# Patient Record
Sex: MALE | Race: WHITE | Employment: FULL TIME | URBAN - METROPOLITAN AREA
[De-identification: names, ages, dates, MRNs, and addresses within clinical notes are randomized per-mention and may not be internally consistent; named-entity substitution may affect disease eponyms.]

---

## 2017-01-23 ENCOUNTER — OFFICE VISIT (OUTPATIENT)
Dept: INTERNAL MEDICINE CLINIC | Facility: CLINIC | Age: 42
End: 2017-01-23

## 2017-01-23 VITALS
SYSTOLIC BLOOD PRESSURE: 128 MMHG | HEART RATE: 104 BPM | WEIGHT: 241 LBS | DIASTOLIC BLOOD PRESSURE: 86 MMHG | TEMPERATURE: 99 F | BODY MASS INDEX: 33 KG/M2 | OXYGEN SATURATION: 97 %

## 2017-01-23 DIAGNOSIS — J01.90 ACUTE SINUSITIS, RECURRENCE NOT SPECIFIED, UNSPECIFIED LOCATION: Primary | ICD-10-CM

## 2017-01-23 DIAGNOSIS — R05.9 COUGH: ICD-10-CM

## 2017-01-23 PROCEDURE — 99213 OFFICE O/P EST LOW 20 MIN: CPT | Performed by: FAMILY MEDICINE

## 2017-01-23 RX ORDER — AZITHROMYCIN 250 MG/1
TABLET, FILM COATED ORAL
Qty: 6 TABLET | Refills: 0 | Status: SHIPPED | OUTPATIENT
Start: 2017-01-23 | End: 2017-04-19 | Stop reason: ALTCHOICE

## 2017-01-23 RX ORDER — TRIAMCINOLONE ACETONIDE 55 UG/1
1 SPRAY, METERED NASAL DAILY
COMMUNITY

## 2017-01-23 NOTE — PROGRESS NOTES
CHIEF COMPLAINT:   Patient presents with:  Sore Throat: since Thursday (1/19/17) characterized as a scratchy throat, feels like sand paper. A little bit of drainage. No cough. Using Sudafed, Tylenol, and Zicam.  Fatigue: and sleepy.   Feeling cold, prima atraumatic, normocephalic.   + pressure on palpation of maxillary or frontal sinuses  EYES: conjunctiva clear, EOM intact  EARS: TM's dull, no bulging, no retraction,+ fluid, bony landmarks not visible  NOSE: Nostrils patent, mucoid nasal discharge, nasal m

## 2017-02-06 ENCOUNTER — TELEPHONE (OUTPATIENT)
Dept: INTERNAL MEDICINE CLINIC | Facility: CLINIC | Age: 42
End: 2017-02-06

## 2017-02-06 RX ORDER — AMOXICILLIN AND CLAVULANATE POTASSIUM 875; 125 MG/1; MG/1
1 TABLET, FILM COATED ORAL 2 TIMES DAILY
Qty: 20 TABLET | Refills: 0 | Status: SHIPPED | OUTPATIENT
Start: 2017-02-06 | End: 2017-02-16

## 2017-03-01 RX ORDER — LOSARTAN POTASSIUM 50 MG/1
TABLET ORAL
Qty: 90 TABLET | Refills: 0 | Status: SHIPPED | OUTPATIENT
Start: 2017-03-01 | End: 2017-05-12

## 2017-03-28 ENCOUNTER — LAB ENCOUNTER (OUTPATIENT)
Dept: LAB | Age: 42
End: 2017-03-28
Attending: FAMILY MEDICINE
Payer: COMMERCIAL

## 2017-03-28 DIAGNOSIS — R79.9 ABNORMAL BLOOD CHEMISTRY: ICD-10-CM

## 2017-03-28 DIAGNOSIS — E78.1 PURE HYPERGLYCERIDEMIA: ICD-10-CM

## 2017-03-28 DIAGNOSIS — E03.9 HYPOTHYROIDISM, UNSPECIFIED TYPE: ICD-10-CM

## 2017-03-28 DIAGNOSIS — E03.9 HYPOTHYROIDISM: ICD-10-CM

## 2017-03-28 DIAGNOSIS — R74.8 ELEVATED LIVER ENZYMES: ICD-10-CM

## 2017-03-28 DIAGNOSIS — I10 ESSENTIAL HYPERTENSION: ICD-10-CM

## 2017-03-28 LAB
ALBUMIN SERPL-MCNC: 3.8 G/DL (ref 3.5–4.8)
ALP LIVER SERPL-CCNC: 95 U/L (ref 45–117)
ALT SERPL-CCNC: 85 U/L (ref 17–63)
AST SERPL-CCNC: 30 U/L (ref 15–41)
BASOPHILS # BLD AUTO: 0.11 X10(3) UL (ref 0–0.1)
BASOPHILS NFR BLD AUTO: 1.5 %
BILIRUB SERPL-MCNC: 0.7 MG/DL (ref 0.1–2)
BILIRUB UR QL STRIP.AUTO: NEGATIVE
BUN BLD-MCNC: 14 MG/DL (ref 8–20)
CALCIUM BLD-MCNC: 8.9 MG/DL (ref 8.3–10.3)
CHLORIDE: 108 MMOL/L (ref 101–111)
CHOLEST SMN-MCNC: 158 MG/DL (ref ?–200)
CLARITY UR REFRACT.AUTO: CLEAR
CO2: 27 MMOL/L (ref 22–32)
COLOR UR AUTO: YELLOW
CREAT BLD-MCNC: 1.1 MG/DL (ref 0.7–1.3)
EOSINOPHIL # BLD AUTO: 0.55 X10(3) UL (ref 0–0.3)
EOSINOPHIL NFR BLD AUTO: 7.5 %
ERYTHROCYTE [DISTWIDTH] IN BLOOD BY AUTOMATED COUNT: 13 % (ref 11.5–16)
GLUCOSE BLD-MCNC: 84 MG/DL (ref 70–99)
GLUCOSE UR STRIP.AUTO-MCNC: NEGATIVE MG/DL
HCT VFR BLD AUTO: 46.2 % (ref 37–53)
HDLC SERPL-MCNC: 35 MG/DL (ref 45–?)
HDLC SERPL: 4.51 {RATIO} (ref ?–4.97)
HGB BLD-MCNC: 16 G/DL (ref 13–17)
IMMATURE GRANULOCYTE COUNT: 0.05 X10(3) UL (ref 0–1)
IMMATURE GRANULOCYTE RATIO %: 0.7 %
KETONES UR STRIP.AUTO-MCNC: NEGATIVE MG/DL
LDLC SERPL CALC-MCNC: 96 MG/DL (ref ?–130)
LEUKOCYTE ESTERASE UR QL STRIP.AUTO: NEGATIVE
LYMPHOCYTES # BLD AUTO: 2.27 X10(3) UL (ref 0.9–4)
LYMPHOCYTES NFR BLD AUTO: 31 %
M PROTEIN MFR SERPL ELPH: 7.1 G/DL (ref 6.1–8.3)
MCH RBC QN AUTO: 30.1 PG (ref 27–33.2)
MCHC RBC AUTO-ENTMCNC: 34.6 G/DL (ref 31–37)
MCV RBC AUTO: 86.8 FL (ref 80–99)
MONOCYTES # BLD AUTO: 0.54 X10(3) UL (ref 0.1–0.6)
MONOCYTES NFR BLD AUTO: 7.4 %
NEUTROPHIL ABS PRELIM: 3.81 X10 (3) UL (ref 1.3–6.7)
NEUTROPHILS # BLD AUTO: 3.81 X10(3) UL (ref 1.3–6.7)
NEUTROPHILS NFR BLD AUTO: 51.9 %
NITRITE UR QL STRIP.AUTO: NEGATIVE
NONHDLC SERPL-MCNC: 123 MG/DL (ref ?–130)
PH UR STRIP.AUTO: 5 [PH] (ref 4.5–8)
PLATELET # BLD AUTO: 197 10(3)UL (ref 150–450)
POTASSIUM SERPL-SCNC: 4.1 MMOL/L (ref 3.6–5.1)
PROT UR STRIP.AUTO-MCNC: NEGATIVE MG/DL
RBC # BLD AUTO: 5.32 X10(6)UL (ref 4.3–5.7)
RBC UR QL AUTO: NEGATIVE
RED CELL DISTRIBUTION WIDTH-SD: 40.6 FL (ref 35.1–46.3)
SODIUM SERPL-SCNC: 140 MMOL/L (ref 136–144)
SP GR UR STRIP.AUTO: 1.02 (ref 1–1.03)
TRIGLYCERIDES: 137 MG/DL (ref ?–150)
TSI SER-ACNC: 6.69 MIU/ML (ref 0.35–5.5)
UROBILINOGEN UR STRIP.AUTO-MCNC: <2 MG/DL
VLDL: 27 MG/DL (ref 5–40)
WBC # BLD AUTO: 7.3 X10(3) UL (ref 4–13)

## 2017-03-28 PROCEDURE — 85025 COMPLETE CBC W/AUTO DIFF WBC: CPT

## 2017-03-28 PROCEDURE — 84443 ASSAY THYROID STIM HORMONE: CPT

## 2017-03-28 PROCEDURE — 80061 LIPID PANEL: CPT

## 2017-03-28 PROCEDURE — 81003 URINALYSIS AUTO W/O SCOPE: CPT

## 2017-03-28 PROCEDURE — 36415 COLL VENOUS BLD VENIPUNCTURE: CPT

## 2017-03-28 PROCEDURE — 80053 COMPREHEN METABOLIC PANEL: CPT

## 2017-04-19 ENCOUNTER — OFFICE VISIT (OUTPATIENT)
Dept: INTERNAL MEDICINE CLINIC | Facility: CLINIC | Age: 42
End: 2017-04-19

## 2017-04-19 VITALS
DIASTOLIC BLOOD PRESSURE: 82 MMHG | OXYGEN SATURATION: 96 % | TEMPERATURE: 98 F | HEIGHT: 71 IN | BODY MASS INDEX: 34.3 KG/M2 | WEIGHT: 245 LBS | RESPIRATION RATE: 16 BRPM | SYSTOLIC BLOOD PRESSURE: 108 MMHG | HEART RATE: 101 BPM

## 2017-04-19 DIAGNOSIS — I10 ESSENTIAL HYPERTENSION: ICD-10-CM

## 2017-04-19 DIAGNOSIS — E66.9 OBESITY (BMI 30-39.9): ICD-10-CM

## 2017-04-19 DIAGNOSIS — E03.9 HYPOTHYROIDISM, UNSPECIFIED TYPE: ICD-10-CM

## 2017-04-19 DIAGNOSIS — Z00.00 WELL ADULT EXAM: Primary | ICD-10-CM

## 2017-04-19 DIAGNOSIS — R74.8 ELEVATED LIVER ENZYMES: ICD-10-CM

## 2017-04-19 PROCEDURE — 99396 PREV VISIT EST AGE 40-64: CPT | Performed by: FAMILY MEDICINE

## 2017-04-19 NOTE — PROGRESS NOTES
HPI:    Patient ID: Malvin Bertrand is a 39year old male.     HPI  Malvin Bertrand is a 39year old male who presents for a complete physical exam.   HPI:       Wt Readings from Last 6 Encounters:  04/19/17 : 245 lb  01/23/17 : 241 lb  10/19/16 : 233 lb  06/ Other      Grandmother   • Hypertension Father    • Thyroid Disorder Mother    • Cancer Maternal Grandmother      uterine      Social History:    Smoking Status: Never Smoker                      Smokeless Status: Never Used                        Alcohol year old male who presents for a complete physical exam. Pt's weight is Body mass index is 34.19 kg/(m^2). , recommended low fat diet and aerobic exercise 30 minutes three times weekly.  Health maintenance,.discussed labs   Watch tylenol use    Discussed wt

## 2017-05-15 RX ORDER — LOSARTAN POTASSIUM 50 MG/1
TABLET ORAL
Qty: 90 TABLET | Refills: 0 | Status: SHIPPED | OUTPATIENT
Start: 2017-05-15 | End: 2017-07-30

## 2017-06-05 ENCOUNTER — APPOINTMENT (OUTPATIENT)
Dept: LAB | Age: 42
End: 2017-06-05
Attending: FAMILY MEDICINE
Payer: COMMERCIAL

## 2017-06-05 DIAGNOSIS — E03.9 HYPOTHYROIDISM, UNSPECIFIED TYPE: ICD-10-CM

## 2017-06-05 PROCEDURE — 84443 ASSAY THYROID STIM HORMONE: CPT | Performed by: FAMILY MEDICINE

## 2017-06-05 PROCEDURE — 36415 COLL VENOUS BLD VENIPUNCTURE: CPT | Performed by: FAMILY MEDICINE

## 2017-06-07 RX ORDER — LEVOTHYROXINE SODIUM 137 UG/1
137 TABLET ORAL
Qty: 90 TABLET | Refills: 1 | Status: SHIPPED | OUTPATIENT
Start: 2017-06-07 | End: 2017-11-01

## 2017-06-29 ENCOUNTER — OFFICE VISIT (OUTPATIENT)
Dept: INTERNAL MEDICINE CLINIC | Facility: CLINIC | Age: 42
End: 2017-06-29

## 2017-06-29 VITALS
RESPIRATION RATE: 12 BRPM | HEART RATE: 112 BPM | OXYGEN SATURATION: 95 % | DIASTOLIC BLOOD PRESSURE: 82 MMHG | BODY MASS INDEX: 34.51 KG/M2 | SYSTOLIC BLOOD PRESSURE: 138 MMHG | HEIGHT: 71 IN | TEMPERATURE: 99 F | WEIGHT: 246.5 LBS

## 2017-06-29 DIAGNOSIS — K22.2 ESOPHAGEAL OBSTRUCTION DUE TO FOOD IMPACTION: Primary | ICD-10-CM

## 2017-06-29 DIAGNOSIS — T18.128A ESOPHAGEAL OBSTRUCTION DUE TO FOOD IMPACTION: Primary | ICD-10-CM

## 2017-06-29 PROCEDURE — 99213 OFFICE O/P EST LOW 20 MIN: CPT | Performed by: FAMILY MEDICINE

## 2017-06-29 NOTE — PROGRESS NOTES
HPI:    Patient ID: Kate Street is a 39year old male.     HPI  Last night eating a steak and got stuck in the esophagus    Was able to breathe ok but could now get it down   Even saliva was stuck  Was throwing up    Seen in [address] and had EGD to remove th

## 2017-08-01 RX ORDER — LOSARTAN POTASSIUM 50 MG/1
TABLET ORAL
Qty: 90 TABLET | Refills: 0 | Status: SHIPPED | OUTPATIENT
Start: 2017-08-01 | End: 2017-09-14

## 2017-09-14 ENCOUNTER — OFFICE VISIT (OUTPATIENT)
Dept: INTERNAL MEDICINE CLINIC | Facility: CLINIC | Age: 42
End: 2017-09-14

## 2017-09-14 VITALS
BODY MASS INDEX: 34.86 KG/M2 | RESPIRATION RATE: 16 BRPM | SYSTOLIC BLOOD PRESSURE: 146 MMHG | HEART RATE: 108 BPM | OXYGEN SATURATION: 99 % | DIASTOLIC BLOOD PRESSURE: 90 MMHG | HEIGHT: 71 IN | WEIGHT: 249 LBS

## 2017-09-14 DIAGNOSIS — I10 ESSENTIAL HYPERTENSION: Primary | ICD-10-CM

## 2017-09-14 PROCEDURE — 99213 OFFICE O/P EST LOW 20 MIN: CPT | Performed by: FAMILY MEDICINE

## 2017-09-14 RX ORDER — LOSARTAN POTASSIUM AND HYDROCHLOROTHIAZIDE 12.5; 5 MG/1; MG/1
1 TABLET ORAL DAILY
Qty: 90 TABLET | Refills: 0 | Status: SHIPPED | OUTPATIENT
Start: 2017-09-14 | End: 2017-11-17

## 2017-09-14 NOTE — PROGRESS NOTES
HPI:    Patient ID: Ben Kirk is a 43year old male. HPI  Ben Kirk is a 43year old male. HPI:   Patient presents for recheck of his hypertension.  Pt has been taking medications as instructed, no medication side effects, home BP monitorin MCG/ACT Nasal Aerosol 1 spray by Nasal route daily. Disp:  Rfl:    Fexofenadine HCl (ALLEGRA) 180 MG Oral Tab Take 180 mg by mouth daily as needed. Disp:  Rfl:       No past medical history on file. No past surgical history on file.    Social History: orders of the defined types were placed in this encounter.       Meds This Visit:  No prescriptions requested or ordered in this encounter    Imaging & Referrals:  None       [id number]

## 2017-10-04 ENCOUNTER — OFFICE VISIT (OUTPATIENT)
Dept: INTERNAL MEDICINE CLINIC | Facility: CLINIC | Age: 42
End: 2017-10-04

## 2017-10-04 VITALS
SYSTOLIC BLOOD PRESSURE: 124 MMHG | DIASTOLIC BLOOD PRESSURE: 76 MMHG | OXYGEN SATURATION: 96 % | HEART RATE: 91 BPM | HEIGHT: 71 IN | TEMPERATURE: 99 F | RESPIRATION RATE: 22 BRPM | BODY MASS INDEX: 34.86 KG/M2 | WEIGHT: 249 LBS

## 2017-10-04 DIAGNOSIS — I10 ESSENTIAL HYPERTENSION: Primary | ICD-10-CM

## 2017-10-04 PROCEDURE — 99213 OFFICE O/P EST LOW 20 MIN: CPT | Performed by: FAMILY MEDICINE

## 2017-10-04 NOTE — PROGRESS NOTES
HPI:    Patient ID: Aric Meyer is a 43year old male. HPI  Aric Meyer is a 43year old male. HPI:   Patient presents for recheck of his hypertension.  Pt has been taking medications as instructed, no medication side effects, home BP monitorin MCG/ACT Nasal Aerosol 1 spray by Nasal route daily. Disp:  Rfl:    Fexofenadine HCl (ALLEGRA) 180 MG Oral Tab Take 180 mg by mouth daily as needed. Disp:  Rfl:       History reviewed. No pertinent past medical history. History reviewed.  No pertinent kline Exam           ASSESSMENT/PLAN:   No diagnosis found. No orders of the defined types were placed in this encounter.       Meds This Visit:  No prescriptions requested or ordered in this encounter    Imaging & Referrals:  None       PF#[id number]

## 2017-10-15 ENCOUNTER — CHARTING TRANS (OUTPATIENT)
Dept: OTHER | Age: 42
End: 2017-10-15

## 2017-11-01 ENCOUNTER — OFFICE VISIT (OUTPATIENT)
Dept: INTERNAL MEDICINE CLINIC | Facility: CLINIC | Age: 42
End: 2017-11-01

## 2017-11-01 VITALS
TEMPERATURE: 99 F | SYSTOLIC BLOOD PRESSURE: 110 MMHG | BODY MASS INDEX: 34 KG/M2 | WEIGHT: 246.5 LBS | DIASTOLIC BLOOD PRESSURE: 66 MMHG | RESPIRATION RATE: 12 BRPM | HEART RATE: 88 BPM

## 2017-11-01 DIAGNOSIS — J18.9 PNEUMONIA OF RIGHT LUNG DUE TO INFECTIOUS ORGANISM, UNSPECIFIED PART OF LUNG: Primary | ICD-10-CM

## 2017-11-01 PROCEDURE — 99213 OFFICE O/P EST LOW 20 MIN: CPT | Performed by: FAMILY MEDICINE

## 2017-11-01 RX ORDER — LEVOTHYROXINE SODIUM 137 UG/1
TABLET ORAL
Qty: 90 TABLET | Refills: 0 | Status: SHIPPED | OUTPATIENT
Start: 2017-11-01 | End: 2018-02-21

## 2017-11-01 NOTE — PROGRESS NOTES
HPI:    Patient ID: Ankush Samayoa is a 43year old male.     HPI  Here for f/u on PNA     Was seen in [facility] 10/15/17  , dx'd w it in L      Is done w the [facility] now    Was also seen in [address] [place] [address] Dr and told had lung findings as well   Was having cough and

## 2017-11-21 RX ORDER — LOSARTAN POTASSIUM AND HYDROCHLOROTHIAZIDE 12.5; 5 MG/1; MG/1
TABLET ORAL
Qty: 90 TABLET | Refills: 0 | Status: SHIPPED | OUTPATIENT
Start: 2017-11-21 | End: 2018-02-21

## 2017-11-21 NOTE — TELEPHONE ENCOUNTER
Medication passed protocol. Requesting Losartan potassium hctz 50-12.5 mg tabs  LOV: Acute OV 11/1/17 - f/u pneumonia, HTN f/u on 10/4/17  RTC: 6 months  Last Relevant Labs: 3/28/17  Filled: Last sent as 90 with no refills on 9/14/17.      No future trey

## 2017-12-01 ENCOUNTER — TELEPHONE (OUTPATIENT)
Dept: INTERNAL MEDICINE CLINIC | Facility: CLINIC | Age: 42
End: 2017-12-01

## 2017-12-01 RX ORDER — CEFDINIR 300 MG/1
300 CAPSULE ORAL 2 TIMES DAILY
Qty: 20 CAPSULE | Refills: 0 | Status: SHIPPED | OUTPATIENT
Start: 2017-12-01 | End: 2017-12-11

## 2017-12-01 NOTE — TELEPHONE ENCOUNTER
Patient is a  and will be in [facility] and booked an appointment tomorrow but would like an RX sent today for URI if possible.   Told wife that booked an appointment for tomorrow but she would like callback about RX

## 2017-12-02 ENCOUNTER — OFFICE VISIT (OUTPATIENT)
Dept: INTERNAL MEDICINE CLINIC | Facility: CLINIC | Age: 42
End: 2017-12-02

## 2017-12-02 VITALS
HEART RATE: 86 BPM | OXYGEN SATURATION: 95 % | WEIGHT: 248.5 LBS | TEMPERATURE: 99 F | BODY MASS INDEX: 34.79 KG/M2 | HEIGHT: 71 IN | RESPIRATION RATE: 16 BRPM | DIASTOLIC BLOOD PRESSURE: 86 MMHG | SYSTOLIC BLOOD PRESSURE: 110 MMHG

## 2017-12-02 DIAGNOSIS — R05.9 COUGH: Primary | ICD-10-CM

## 2017-12-02 PROCEDURE — 99213 OFFICE O/P EST LOW 20 MIN: CPT | Performed by: FAMILY MEDICINE

## 2017-12-02 RX ORDER — ALBUTEROL SULFATE 90 UG/1
2 AEROSOL, METERED RESPIRATORY (INHALATION) EVERY 4 HOURS PRN
Qty: 1 INHALER | Refills: 1 | Status: SHIPPED | OUTPATIENT
Start: 2017-12-02 | End: 2019-02-20 | Stop reason: ALTCHOICE

## 2017-12-02 NOTE — PROGRESS NOTES
HPI:    Patient ID: Gordon Monroy is a 43year old male. HPI  HPI:   Gordon Monroy is a 43year old male who presents for upper respiratory symptoms for  4  days.  Patient reports congestion, cough with ylw colored sputum, cough is keeping pt up at nig GENERAL: well developed, well nourished,in no apparent distress  SKIN: no rashes  EYES:conj clear  HEENT: atraumatic, normocephalic,ears and throat are clear  NECK: supple,no adenopathy,  LUNGS: clear to auscultation        ASSESSMENT AND PLAN:   Alba Kraft

## 2018-01-02 ENCOUNTER — OFFICE VISIT (OUTPATIENT)
Dept: INTERNAL MEDICINE CLINIC | Facility: CLINIC | Age: 43
End: 2018-01-02

## 2018-01-02 VITALS
OXYGEN SATURATION: 97 % | HEART RATE: 83 BPM | DIASTOLIC BLOOD PRESSURE: 84 MMHG | RESPIRATION RATE: 16 BRPM | SYSTOLIC BLOOD PRESSURE: 118 MMHG | TEMPERATURE: 99 F | WEIGHT: 242.75 LBS | BODY MASS INDEX: 34 KG/M2

## 2018-01-02 DIAGNOSIS — J11.1 INFLUENZA: Primary | ICD-10-CM

## 2018-01-02 PROCEDURE — 99213 OFFICE O/P EST LOW 20 MIN: CPT | Performed by: FAMILY MEDICINE

## 2018-01-02 RX ORDER — OSELTAMIVIR PHOSPHATE 75 MG/1
75 CAPSULE ORAL 2 TIMES DAILY
Qty: 10 CAPSULE | Refills: 0 | Status: SHIPPED | OUTPATIENT
Start: 2018-01-02 | End: 2018-05-17 | Stop reason: ALTCHOICE

## 2018-01-02 NOTE — PROGRESS NOTES
CHIEF COMPLAINT:   Patient presents with:  Flu: since yesterday morning. HPI:   Ben Kirk is a 43year old male who presents for upper respiratory symptoms for  2 days.  Patient reports congestion, low grade fever, dry cough, ear pain, OTC cold m palpation of maxillary or frontal sinuses  EYES: conjunctiva clear, EOM intact  EARS: TM's dull.  no bulging, no retraction,+ fluid  NOSE: Nostrils patent, clear nasal discharge, nasal mucosa deep pink  THROAT: Oral mucosa pink, moist. Posterior pharynx is

## 2018-02-19 ENCOUNTER — PATIENT MESSAGE (OUTPATIENT)
Dept: INTERNAL MEDICINE CLINIC | Facility: CLINIC | Age: 43
End: 2018-02-19

## 2018-02-19 NOTE — TELEPHONE ENCOUNTER
From: Karen Nolen  To: Tiffanie Oneill MD  Sent: 2/19/2018 11:56 AM CST  Subject: Prescription Question    Just a heads up. I've submitted refill request through [facility]. The new mail order pharmacy for my insurance.

## 2018-02-21 NOTE — TELEPHONE ENCOUNTER
Ok to send 90 to mail order for these meds?  10//2017 lasrt med visit seen since then for acute visits

## 2018-02-22 RX ORDER — LEVOTHYROXINE SODIUM 137 UG/1
TABLET ORAL
Qty: 90 TABLET | Refills: 0 | Status: SHIPPED | OUTPATIENT
Start: 2018-02-22 | End: 2018-03-28 | Stop reason: DRUGHIGH

## 2018-02-22 RX ORDER — LOSARTAN POTASSIUM AND HYDROCHLOROTHIAZIDE 12.5; 5 MG/1; MG/1
1 TABLET ORAL
Qty: 90 TABLET | Refills: 0 | Status: SHIPPED | OUTPATIENT
Start: 2018-02-22 | End: 2018-05-17

## 2018-03-26 ENCOUNTER — APPOINTMENT (OUTPATIENT)
Dept: LAB | Age: 43
End: 2018-03-26
Attending: FAMILY MEDICINE
Payer: COMMERCIAL

## 2018-03-26 DIAGNOSIS — I10 ESSENTIAL HYPERTENSION, BENIGN: ICD-10-CM

## 2018-03-26 DIAGNOSIS — E78.1 HYPERGLYCERIDEMIA: ICD-10-CM

## 2018-03-26 DIAGNOSIS — E03.9 HYPOTHYROIDISM, ADULT: ICD-10-CM

## 2018-03-26 LAB
ALBUMIN SERPL-MCNC: 3.8 G/DL (ref 3.5–4.8)
ALP LIVER SERPL-CCNC: 115 U/L (ref 45–117)
ALT SERPL-CCNC: 94 U/L (ref 17–63)
AST SERPL-CCNC: 41 U/L (ref 15–41)
BILIRUB SERPL-MCNC: 0.8 MG/DL (ref 0.1–2)
BUN BLD-MCNC: 13 MG/DL (ref 8–20)
CALCIUM BLD-MCNC: 8.7 MG/DL (ref 8.3–10.3)
CHLORIDE: 106 MMOL/L (ref 101–111)
CHOLEST SMN-MCNC: 146 MG/DL (ref ?–200)
CO2: 27 MMOL/L (ref 22–32)
CREAT BLD-MCNC: 1.04 MG/DL (ref 0.7–1.3)
GLUCOSE BLD-MCNC: 89 MG/DL (ref 70–99)
HDLC SERPL-MCNC: 29 MG/DL (ref 45–?)
HDLC SERPL: 5.03 {RATIO} (ref ?–4.97)
LDLC SERPL CALC-MCNC: 91 MG/DL (ref ?–130)
M PROTEIN MFR SERPL ELPH: 7 G/DL (ref 6.1–8.3)
NONHDLC SERPL-MCNC: 117 MG/DL (ref ?–130)
POTASSIUM SERPL-SCNC: 3.9 MMOL/L (ref 3.6–5.1)
SODIUM SERPL-SCNC: 140 MMOL/L (ref 136–144)
TRIGL SERPL-MCNC: 132 MG/DL (ref ?–150)
TSI SER-ACNC: 8.98 MIU/ML (ref 0.35–5.5)
VLDLC SERPL CALC-MCNC: 26 MG/DL (ref 5–40)

## 2018-03-26 PROCEDURE — 84443 ASSAY THYROID STIM HORMONE: CPT

## 2018-03-26 PROCEDURE — 80053 COMPREHEN METABOLIC PANEL: CPT

## 2018-03-26 PROCEDURE — 36415 COLL VENOUS BLD VENIPUNCTURE: CPT

## 2018-03-26 PROCEDURE — 80061 LIPID PANEL: CPT

## 2018-03-28 ENCOUNTER — TELEPHONE (OUTPATIENT)
Dept: INTERNAL MEDICINE CLINIC | Facility: CLINIC | Age: 43
End: 2018-03-28

## 2018-03-28 DIAGNOSIS — E03.9 HYPOTHYROIDISM, UNSPECIFIED TYPE: Primary | ICD-10-CM

## 2018-03-28 RX ORDER — LEVOTHYROXINE SODIUM 0.15 MG/1
150 TABLET ORAL
Qty: 90 TABLET | Refills: 0 | Status: SHIPPED | OUTPATIENT
Start: 2018-03-28 | End: 2018-05-17

## 2018-03-28 NOTE — TELEPHONE ENCOUNTER
----- Message from Nicholas Spivey MD sent at 3/27/2018  8:55 AM CDT -----  Increase levothyroxine to 150mcg daily #90  Vishal 2 weeks  Otherwise labs are stable

## 2018-04-13 RX ORDER — LOSARTAN POTASSIUM AND HYDROCHLOROTHIAZIDE 12.5; 5 MG/1; MG/1
1 TABLET ORAL
Qty: 90 TABLET | OUTPATIENT
Start: 2018-04-13

## 2018-04-13 RX ORDER — LEVOTHYROXINE SODIUM 137 UG/1
TABLET ORAL
Qty: 90 TABLET | OUTPATIENT
Start: 2018-04-13

## 2018-04-16 ENCOUNTER — APPOINTMENT (OUTPATIENT)
Dept: LAB | Age: 43
End: 2018-04-16
Attending: FAMILY MEDICINE
Payer: COMMERCIAL

## 2018-04-16 DIAGNOSIS — E03.9 HYPOTHYROIDISM, UNSPECIFIED TYPE: ICD-10-CM

## 2018-04-16 PROCEDURE — 36415 COLL VENOUS BLD VENIPUNCTURE: CPT

## 2018-04-16 PROCEDURE — 84443 ASSAY THYROID STIM HORMONE: CPT

## 2018-04-17 ENCOUNTER — TELEPHONE (OUTPATIENT)
Dept: INTERNAL MEDICINE CLINIC | Facility: CLINIC | Age: 43
End: 2018-04-17

## 2018-04-17 DIAGNOSIS — E03.9 HYPOTHYROIDISM, UNSPECIFIED TYPE: Primary | ICD-10-CM

## 2018-04-17 NOTE — TELEPHONE ENCOUNTER
----- Message from Jana Baker MD sent at 4/16/2018  5:31 PM CDT -----  Seems a bit slow to respond   Lets gisell in a month please

## 2018-04-24 ENCOUNTER — MED REC SCAN ONLY (OUTPATIENT)
Dept: INTERNAL MEDICINE CLINIC | Facility: CLINIC | Age: 43
End: 2018-04-24

## 2018-05-15 ENCOUNTER — APPOINTMENT (OUTPATIENT)
Dept: LAB | Age: 43
End: 2018-05-15
Attending: FAMILY MEDICINE
Payer: COMMERCIAL

## 2018-05-15 DIAGNOSIS — E03.9 HYPOTHYROIDISM, UNSPECIFIED TYPE: ICD-10-CM

## 2018-05-15 PROCEDURE — 84443 ASSAY THYROID STIM HORMONE: CPT

## 2018-05-15 PROCEDURE — 36415 COLL VENOUS BLD VENIPUNCTURE: CPT

## 2018-05-17 ENCOUNTER — OFFICE VISIT (OUTPATIENT)
Dept: INTERNAL MEDICINE CLINIC | Facility: CLINIC | Age: 43
End: 2018-05-17

## 2018-05-17 VITALS
TEMPERATURE: 99 F | BODY MASS INDEX: 34.16 KG/M2 | SYSTOLIC BLOOD PRESSURE: 110 MMHG | WEIGHT: 244 LBS | OXYGEN SATURATION: 98 % | HEART RATE: 85 BPM | DIASTOLIC BLOOD PRESSURE: 72 MMHG | HEIGHT: 71 IN | RESPIRATION RATE: 16 BRPM

## 2018-05-17 DIAGNOSIS — Z00.00 WELLNESS EXAMINATION: Primary | ICD-10-CM

## 2018-05-17 DIAGNOSIS — E03.9 HYPOTHYROIDISM, UNSPECIFIED TYPE: ICD-10-CM

## 2018-05-17 PROCEDURE — 99396 PREV VISIT EST AGE 40-64: CPT | Performed by: FAMILY MEDICINE

## 2018-05-17 RX ORDER — LOSARTAN POTASSIUM AND HYDROCHLOROTHIAZIDE 12.5; 5 MG/1; MG/1
1 TABLET ORAL
Qty: 90 TABLET | Refills: 1 | Status: SHIPPED | OUTPATIENT
Start: 2018-05-17 | End: 2018-10-21

## 2018-05-17 RX ORDER — LEVOTHYROXINE SODIUM 0.15 MG/1
150 TABLET ORAL
Qty: 90 TABLET | Refills: 1 | Status: SHIPPED | OUTPATIENT
Start: 2018-05-17 | End: 2018-10-21

## 2018-05-17 NOTE — PROGRESS NOTES
HPI:    Patient ID: Shirlene Fowler is a 43year old male.     HPI  Shirlene Fowler is a 43year old male who presents for a complete physical exam.   HPI:       Wt Readings from Last 6 Encounters:  05/17/18 : 244 lb  01/02/18 : 242 lb 12 oz  12/02/17 : 248 l Aero Soln Inhale 2 puffs into the lungs every 4 (four) hours as needed for Wheezing. Disp: 1 Inhaler Rfl: 1      No past medical history on file. No past surgical history on file.    Family History   Problem Relation Age of Onset   • Cancer Other      Col rectal tone, prostate shows no masses, stool is OB negative  MUSCULOSKELETAL: back is not tender,FROM of the back  EXTREMITIES: no cyanosis, clubbing or edema  NEURO: Oriented times three,cranial nerves are intact,motor and sensory are grossly intact    AS

## 2018-09-20 ENCOUNTER — APPOINTMENT (OUTPATIENT)
Dept: LAB | Age: 43
End: 2018-09-20
Attending: FAMILY MEDICINE
Payer: COMMERCIAL

## 2018-09-20 ENCOUNTER — OFFICE VISIT (OUTPATIENT)
Dept: INTERNAL MEDICINE CLINIC | Facility: CLINIC | Age: 43
End: 2018-09-20
Payer: COMMERCIAL

## 2018-09-20 VITALS
WEIGHT: 246 LBS | OXYGEN SATURATION: 97 % | BODY MASS INDEX: 34.44 KG/M2 | HEIGHT: 71 IN | RESPIRATION RATE: 14 BRPM | DIASTOLIC BLOOD PRESSURE: 77 MMHG | HEART RATE: 79 BPM | TEMPERATURE: 99 F | SYSTOLIC BLOOD PRESSURE: 123 MMHG

## 2018-09-20 DIAGNOSIS — I10 ESSENTIAL HYPERTENSION: ICD-10-CM

## 2018-09-20 DIAGNOSIS — E03.9 HYPOTHYROIDISM, UNSPECIFIED TYPE: Primary | ICD-10-CM

## 2018-09-20 DIAGNOSIS — E03.9 HYPOTHYROIDISM, UNSPECIFIED TYPE: ICD-10-CM

## 2018-09-20 LAB — TSI SER-ACNC: 4.81 MIU/ML (ref 0.35–5.5)

## 2018-09-20 PROCEDURE — 84443 ASSAY THYROID STIM HORMONE: CPT

## 2018-09-20 PROCEDURE — 36415 COLL VENOUS BLD VENIPUNCTURE: CPT

## 2018-09-20 PROCEDURE — 99213 OFFICE O/P EST LOW 20 MIN: CPT | Performed by: FAMILY MEDICINE

## 2018-09-20 NOTE — PROGRESS NOTES
HPI:    Patient ID: Porfirio Leonard is a 37year old male.     HPI  Here for med ck   Overall doing well   No recent BP cks   Taking meds as directed    Needs letter for [address] again     Review of Systems   Respiratory: Negative for cough, chest tightness and s

## 2018-09-21 ENCOUNTER — TELEPHONE (OUTPATIENT)
Dept: INTERNAL MEDICINE CLINIC | Facility: CLINIC | Age: 43
End: 2018-09-21

## 2018-09-21 DIAGNOSIS — E03.9 HYPOTHYROIDISM, UNSPECIFIED TYPE: Primary | ICD-10-CM

## 2018-10-21 DIAGNOSIS — E03.9 HYPOTHYROIDISM, UNSPECIFIED TYPE: ICD-10-CM

## 2018-10-22 RX ORDER — LEVOTHYROXINE SODIUM 0.15 MG/1
TABLET ORAL
Qty: 90 TABLET | Refills: 1 | Status: SHIPPED | OUTPATIENT
Start: 2018-10-22 | End: 2019-03-29

## 2018-10-22 RX ORDER — LOSARTAN POTASSIUM AND HYDROCHLOROTHIAZIDE 12.5; 5 MG/1; MG/1
1 TABLET ORAL
Qty: 90 TABLET | Refills: 1 | Status: SHIPPED | OUTPATIENT
Start: 2018-10-22 | End: 2019-03-29

## 2018-10-25 ENCOUNTER — HOSPITAL ENCOUNTER (OUTPATIENT)
Dept: GENERAL RADIOLOGY | Age: 43
Discharge: HOME | End: 2018-10-25
Attending: FAMILY MEDICINE
Payer: COMMERCIAL

## 2018-10-25 ENCOUNTER — OFFICE VISIT (OUTPATIENT)
Dept: INTERNAL MEDICINE CLINIC | Facility: CLINIC | Age: 43
End: 2018-10-25
Payer: COMMERCIAL

## 2018-10-25 VITALS
TEMPERATURE: 98 F | DIASTOLIC BLOOD PRESSURE: 74 MMHG | BODY MASS INDEX: 35 KG/M2 | HEIGHT: 71 IN | WEIGHT: 250 LBS | RESPIRATION RATE: 16 BRPM | OXYGEN SATURATION: 98 % | SYSTOLIC BLOOD PRESSURE: 126 MMHG | HEART RATE: 84 BPM

## 2018-10-25 DIAGNOSIS — M79.671 FOOT PAIN, RIGHT: ICD-10-CM

## 2018-10-25 DIAGNOSIS — M79.671 FOOT PAIN, RIGHT: Primary | ICD-10-CM

## 2018-10-25 PROCEDURE — 99213 OFFICE O/P EST LOW 20 MIN: CPT | Performed by: FAMILY MEDICINE

## 2018-10-25 PROCEDURE — 73630 X-RAY EXAM OF FOOT: CPT | Performed by: FAMILY MEDICINE

## 2018-10-25 RX ORDER — MELOXICAM 15 MG/1
15 TABLET ORAL DAILY
Qty: 30 TABLET | Refills: 0 | Status: SHIPPED | OUTPATIENT
Start: 2018-10-25 | End: 2019-02-20 | Stop reason: ALTCHOICE

## 2018-10-25 NOTE — PROGRESS NOTES
HPI:    Patient ID: Maeve Hunt is a 37year old male.     HPI  R foot plantar aspect sore 2 months   No injury  Can be worse in the AM  , not always though    No new shoes   No swelling  Aware of     Tender near the heel ,medial aspect      No meds    R

## 2018-11-02 VITALS
HEIGHT: 72 IN | WEIGHT: 240 LBS | HEART RATE: 102 BPM | OXYGEN SATURATION: 98 % | RESPIRATION RATE: 20 BRPM | BODY MASS INDEX: 32.51 KG/M2 | TEMPERATURE: 98.9 F

## 2019-02-20 ENCOUNTER — OFFICE VISIT (OUTPATIENT)
Dept: INTERNAL MEDICINE CLINIC | Facility: CLINIC | Age: 44
End: 2019-02-20
Payer: COMMERCIAL

## 2019-02-20 VITALS
DIASTOLIC BLOOD PRESSURE: 81 MMHG | TEMPERATURE: 99 F | WEIGHT: 248.75 LBS | BODY MASS INDEX: 35.21 KG/M2 | HEART RATE: 78 BPM | HEIGHT: 70.5 IN | SYSTOLIC BLOOD PRESSURE: 120 MMHG | RESPIRATION RATE: 16 BRPM

## 2019-02-20 DIAGNOSIS — H69.83 DYSFUNCTION OF BOTH EUSTACHIAN TUBES: ICD-10-CM

## 2019-02-20 DIAGNOSIS — J01.10 ACUTE FRONTAL SINUSITIS, RECURRENCE NOT SPECIFIED: Primary | ICD-10-CM

## 2019-02-20 PROCEDURE — 99213 OFFICE O/P EST LOW 20 MIN: CPT | Performed by: FAMILY MEDICINE

## 2019-02-20 RX ORDER — AMOXICILLIN AND CLAVULANATE POTASSIUM 875; 125 MG/1; MG/1
1 TABLET, FILM COATED ORAL 2 TIMES DAILY
Qty: 20 TABLET | Refills: 0 | Status: SHIPPED | OUTPATIENT
Start: 2019-02-20 | End: 2019-03-02

## 2019-02-20 RX ORDER — METHYLPREDNISOLONE 4 MG/1
TABLET ORAL
Qty: 1 KIT | Refills: 0 | Status: SHIPPED | OUTPATIENT
Start: 2019-02-20 | End: 2019-02-26 | Stop reason: ALTCHOICE

## 2019-02-20 NOTE — PROGRESS NOTES
CHIEF COMPLAINT:   Patient presents with:  Sinus Problem: sinus pressure every other day   Ear Problem: ear pressure       HPI:   Porfirio Leonard is a 37year old male who presents for sinus symptoms for  3 weeks.  Patient reports congestion, dry cough, coug well nourished,in no apparent distress  SKIN: no rashes,no suspicious lesions  HEAD: atraumatic, normocephalic.  + tenderness on palpation of maxillary or frontal sinuses  EYES: conjunctiva clear, EOM intact  EARS: TM's dull, no bulging, no retraction,+ fl

## 2019-02-26 ENCOUNTER — OFFICE VISIT (OUTPATIENT)
Dept: INTERNAL MEDICINE CLINIC | Facility: CLINIC | Age: 44
End: 2019-02-26
Payer: COMMERCIAL

## 2019-02-26 VITALS
DIASTOLIC BLOOD PRESSURE: 88 MMHG | SYSTOLIC BLOOD PRESSURE: 122 MMHG | RESPIRATION RATE: 14 BRPM | WEIGHT: 249 LBS | HEART RATE: 74 BPM | HEIGHT: 70.5 IN | TEMPERATURE: 97 F | OXYGEN SATURATION: 96 % | BODY MASS INDEX: 35.25 KG/M2

## 2019-02-26 DIAGNOSIS — H66.91 ACUTE RIGHT OTITIS MEDIA: Primary | ICD-10-CM

## 2019-02-26 DIAGNOSIS — J32.9 SINUSITIS, UNSPECIFIED CHRONICITY, UNSPECIFIED LOCATION: ICD-10-CM

## 2019-02-26 PROCEDURE — 99213 OFFICE O/P EST LOW 20 MIN: CPT | Performed by: FAMILY MEDICINE

## 2019-02-26 RX ORDER — PREDNISONE 10 MG/1
TABLET ORAL
Qty: 20 TABLET | Refills: 0 | Status: SHIPPED | OUTPATIENT
Start: 2019-02-26 | End: 2019-05-23 | Stop reason: ALTCHOICE

## 2019-02-26 RX ORDER — PREDNISONE 10 MG/1
TABLET ORAL
Qty: 20 TABLET | Refills: 0 | Status: CANCELLED | OUTPATIENT
Start: 2019-02-26

## 2019-02-26 RX ORDER — CEFDINIR 300 MG/1
300 CAPSULE ORAL 2 TIMES DAILY
Qty: 20 CAPSULE | Refills: 0 | Status: SHIPPED | OUTPATIENT
Start: 2019-02-26 | End: 2019-05-23 | Stop reason: ALTCHOICE

## 2019-02-26 NOTE — PROGRESS NOTES
CHIEF COMPLAINT:   Patient presents with:  Sinusitis: ears feel clogged/pressure, lots of drainage.  red in face, pt isn't sure if that is okay      HPI:   Karen Nolen is a 37year old male who presents for reoccurring upper respiratory symptoms, was see Alcohol use: No      Alcohol/week: 0.0 oz    Drug use: No        REVIEW OF SYSTEMS:   GENERAL: feels well otherwise,  normal appetite  SKIN: no rashes or abnormal skin lesions  HEENT: See HPI  LUNGS: denies shortness of breath or wheezing, See HPI  CARDIOV use a humidifier.   - cefdinir 300 MG Oral Cap; Take 1 capsule (300 mg total) by mouth 2 (two) times daily. Dispense: 20 capsule;  Refill: 0      Sinusitis, unspecified chronicity, unspecified location  Acute right otitis media  (primary encounter diagnosi

## 2019-03-29 DIAGNOSIS — E03.9 HYPOTHYROIDISM, UNSPECIFIED TYPE: ICD-10-CM

## 2019-04-01 RX ORDER — LEVOTHYROXINE SODIUM 0.15 MG/1
TABLET ORAL
Qty: 90 TABLET | Refills: 0 | Status: SHIPPED | OUTPATIENT
Start: 2019-04-01 | End: 2019-05-23 | Stop reason: ALTCHOICE

## 2019-04-01 RX ORDER — LOSARTAN POTASSIUM AND HYDROCHLOROTHIAZIDE 12.5; 5 MG/1; MG/1
1 TABLET ORAL
Qty: 90 TABLET | Refills: 0 | Status: SHIPPED | OUTPATIENT
Start: 2019-04-01 | End: 2019-06-11

## 2019-04-01 NOTE — TELEPHONE ENCOUNTER
Levothyroxine filled 10/22/18 #90 with 1 refill     Losartan Potassium - HCTZ last filled 10/22/18 #90 with 1 refill    LOV for BP 9/20/18     Last Labs : TSH 9/20/18 - Recheck 6 months                   CMP 3/26/18    LVM FOR PATIENT TO CALL AND MAKE APPT

## 2019-04-24 ENCOUNTER — PATIENT MESSAGE (OUTPATIENT)
Dept: INTERNAL MEDICINE CLINIC | Facility: CLINIC | Age: 44
End: 2019-04-24

## 2019-04-24 NOTE — TELEPHONE ENCOUNTER
From: Maria Fernanda Lora  To: Junaid Joseph MD  Sent: 4/24/2019 5:02 PM CDT  Subject: Other    Got yearly physical scheduled. Currently 5/23.     If we can get all the bloodwork orders in and updated thyroid test, I'll get that all taken care of next week in a

## 2019-04-25 DIAGNOSIS — Z00.00 LABORATORY EXAM ORDERED AS PART OF ROUTINE GENERAL MEDICAL EXAMINATION: Primary | ICD-10-CM

## 2019-05-03 ENCOUNTER — LAB ENCOUNTER (OUTPATIENT)
Dept: LAB | Age: 44
End: 2019-05-03
Attending: FAMILY MEDICINE
Payer: COMMERCIAL

## 2019-05-03 DIAGNOSIS — Z00.00 LABORATORY EXAM ORDERED AS PART OF ROUTINE GENERAL MEDICAL EXAMINATION: ICD-10-CM

## 2019-05-03 DIAGNOSIS — E03.9 HYPOTHYROIDISM, UNSPECIFIED TYPE: ICD-10-CM

## 2019-05-03 PROCEDURE — 80061 LIPID PANEL: CPT

## 2019-05-03 PROCEDURE — 36415 COLL VENOUS BLD VENIPUNCTURE: CPT

## 2019-05-03 PROCEDURE — 85025 COMPLETE CBC W/AUTO DIFF WBC: CPT

## 2019-05-03 PROCEDURE — 84443 ASSAY THYROID STIM HORMONE: CPT

## 2019-05-03 PROCEDURE — 80053 COMPREHEN METABOLIC PANEL: CPT

## 2019-05-03 PROCEDURE — 84439 ASSAY OF FREE THYROXINE: CPT

## 2019-05-08 DIAGNOSIS — E03.9 HYPOTHYROIDISM, UNSPECIFIED TYPE: Primary | ICD-10-CM

## 2019-05-08 RX ORDER — LEVOTHYROXINE SODIUM 175 UG/1
175 TABLET ORAL
Qty: 90 TABLET | Refills: 1 | Status: SHIPPED | OUTPATIENT
Start: 2019-05-08 | End: 2019-09-06

## 2019-05-23 ENCOUNTER — OFFICE VISIT (OUTPATIENT)
Dept: INTERNAL MEDICINE CLINIC | Facility: CLINIC | Age: 44
End: 2019-05-23
Payer: COMMERCIAL

## 2019-05-23 VITALS
HEIGHT: 71 IN | DIASTOLIC BLOOD PRESSURE: 68 MMHG | HEART RATE: 95 BPM | RESPIRATION RATE: 16 BRPM | SYSTOLIC BLOOD PRESSURE: 110 MMHG | OXYGEN SATURATION: 98 % | BODY MASS INDEX: 35.84 KG/M2 | WEIGHT: 256 LBS | TEMPERATURE: 98 F

## 2019-05-23 DIAGNOSIS — Z00.00 WELLNESS EXAMINATION: Primary | ICD-10-CM

## 2019-05-23 DIAGNOSIS — Z00.00 LABORATORY EXAM ORDERED AS PART OF ROUTINE GENERAL MEDICAL EXAMINATION: ICD-10-CM

## 2019-05-23 PROCEDURE — 99396 PREV VISIT EST AGE 40-64: CPT | Performed by: FAMILY MEDICINE

## 2019-05-23 NOTE — PROGRESS NOTES
HPI:    Patient ID: Porfirio Leonard is a 37year old male.     HPI  Porfirio Leonard is a 37year old male who presents for a complete physical exam.   HPI:       Wt Readings from Last 6 Encounters:  05/23/19 : 256 lb  02/26/19 : 249 lb  02/20/19 : 248 lb 12 o Hypertension Father    • Thyroid Disorder Mother    • Cancer Maternal Grandmother         uterine      Social History:  Social History    Tobacco Use      Smoking status: Never Smoker      Smokeless tobacco: Never Used    Alcohol use: No      Alcohol/week: total) by mouth before breakfast. Disp: 90 tablet Rfl: 1   LOSARTAN POTASSIUM-HCTZ 50-12.5 MG Oral Tab TAKE 1 TABLET BY MOUTH ONCE DAILY Disp: 90 tablet Rfl: 0   Triamcinolone Acetonide (NASACORT ALLERGY 24HR) 55 MCG/ACT Nasal Aerosol 1 spray by Nasal rout

## 2019-06-12 RX ORDER — LOSARTAN POTASSIUM AND HYDROCHLOROTHIAZIDE 12.5; 5 MG/1; MG/1
1 TABLET ORAL
Qty: 90 TABLET | Refills: 1 | Status: SHIPPED | OUTPATIENT
Start: 2019-06-12 | End: 2019-12-10 | Stop reason: RX

## 2019-07-13 ENCOUNTER — OFFICE VISIT (OUTPATIENT)
Dept: FAMILY MEDICINE CLINIC | Facility: CLINIC | Age: 44
End: 2019-07-13
Payer: COMMERCIAL

## 2019-07-13 VITALS
HEART RATE: 93 BPM | SYSTOLIC BLOOD PRESSURE: 122 MMHG | OXYGEN SATURATION: 99 % | BODY MASS INDEX: 35 KG/M2 | RESPIRATION RATE: 20 BRPM | TEMPERATURE: 99 F | DIASTOLIC BLOOD PRESSURE: 84 MMHG | WEIGHT: 249.81 LBS

## 2019-07-13 DIAGNOSIS — J06.9 ACUTE URI: Primary | ICD-10-CM

## 2019-07-13 DIAGNOSIS — Z87.01 HISTORY OF PNEUMONIA: ICD-10-CM

## 2019-07-13 PROCEDURE — 99213 OFFICE O/P EST LOW 20 MIN: CPT | Performed by: NURSE PRACTITIONER

## 2019-07-13 RX ORDER — BENZONATATE 200 MG/1
200 CAPSULE ORAL 3 TIMES DAILY PRN
Qty: 20 CAPSULE | Refills: 0 | Status: SHIPPED | OUTPATIENT
Start: 2019-07-13 | End: 2019-07-20

## 2019-07-13 RX ORDER — AZITHROMYCIN 250 MG/1
TABLET, FILM COATED ORAL
Qty: 6 TABLET | Refills: 0 | Status: SHIPPED | OUTPATIENT
Start: 2019-07-13 | End: 2019-09-18 | Stop reason: ALTCHOICE

## 2019-07-13 NOTE — PROGRESS NOTES
CHIEF COMPLAINT:   Patient presents with:  Cough      HPI:   Gloria Jones is a 37year old male who presents for upper respiratory symptoms for  1 weeks.  Patient reports dry cough, cough is keeping pt up at night, chest pain from coughing, OTC cold med SpO2 99%   BMI 34.84 kg/m²   GENERAL: well developed, well nourished, and in no apparent distress, afebrile  SKIN: no rashes, no suspicious lesions  HEAD: atraumatic, normocephalic.  no tenderness on palpation of maxillary or frontal sinuses.   EYES: conjun

## 2019-09-06 DIAGNOSIS — E03.9 HYPOTHYROIDISM, UNSPECIFIED TYPE: ICD-10-CM

## 2019-09-06 RX ORDER — LEVOTHYROXINE SODIUM 175 UG/1
TABLET ORAL
Qty: 90 TABLET | Refills: 0 | Status: SHIPPED | OUTPATIENT
Start: 2019-09-06 | End: 2019-12-09

## 2019-09-13 ENCOUNTER — APPOINTMENT (OUTPATIENT)
Dept: LAB | Age: 44
End: 2019-09-13
Attending: FAMILY MEDICINE
Payer: COMMERCIAL

## 2019-09-13 DIAGNOSIS — E03.9 HYPOTHYROIDISM, UNSPECIFIED TYPE: ICD-10-CM

## 2019-09-13 LAB
T4 FREE SERPL-MCNC: 1.3 NG/DL (ref 0.8–1.7)
TSI SER-ACNC: 4.91 MIU/ML (ref 0.36–3.74)

## 2019-09-13 PROCEDURE — 84439 ASSAY OF FREE THYROXINE: CPT

## 2019-09-13 PROCEDURE — 84443 ASSAY THYROID STIM HORMONE: CPT

## 2019-09-13 PROCEDURE — 36415 COLL VENOUS BLD VENIPUNCTURE: CPT

## 2019-09-18 ENCOUNTER — OFFICE VISIT (OUTPATIENT)
Dept: INTERNAL MEDICINE CLINIC | Facility: CLINIC | Age: 44
End: 2019-09-18
Payer: COMMERCIAL

## 2019-09-18 ENCOUNTER — TELEPHONE (OUTPATIENT)
Dept: INTERNAL MEDICINE CLINIC | Facility: CLINIC | Age: 44
End: 2019-09-18

## 2019-09-18 VITALS
OXYGEN SATURATION: 97 % | RESPIRATION RATE: 16 BRPM | BODY MASS INDEX: 35.73 KG/M2 | HEART RATE: 83 BPM | TEMPERATURE: 98 F | DIASTOLIC BLOOD PRESSURE: 62 MMHG | WEIGHT: 255.25 LBS | HEIGHT: 71 IN | SYSTOLIC BLOOD PRESSURE: 124 MMHG

## 2019-09-18 DIAGNOSIS — E66.9 OBESITY (BMI 30-39.9): ICD-10-CM

## 2019-09-18 DIAGNOSIS — I10 ESSENTIAL HYPERTENSION: Primary | ICD-10-CM

## 2019-09-18 DIAGNOSIS — E03.9 HYPOTHYROIDISM, UNSPECIFIED TYPE: ICD-10-CM

## 2019-09-18 PROCEDURE — 99214 OFFICE O/P EST MOD 30 MIN: CPT | Performed by: FAMILY MEDICINE

## 2019-09-18 NOTE — PROGRESS NOTES
HPI:    Patient ID: Claudeen Pinks is a 40year old male. HPI  Claudeen Pinks is a 40year old male. HPI:   Pt is here for med ck/follow up. Overall is doing well. Is taking meds as directed.   No issues w medications  Was taking care of father after agrees to the plan. The patient is asked to return in 6mo.       Review of Systems         Current Outpatient Medications:  LEVOTHYROXINE SODIUM 175 MCG Oral Tab TAKE 1 TABLET BY MOUTH  BEFORE BREAKFAST Disp: 90 tablet Rfl: 0   LOSARTAN POTASSIUM-HCTZ 50-1

## 2019-09-18 NOTE — TELEPHONE ENCOUNTER
Called patient to inform him that the letter he requested from [employer] [facility] will be at the  for . I also placed a copy in the [employer] folder in [id number] pod if patient needs to have it sent anywhere in particular.

## 2019-09-24 ENCOUNTER — TELEPHONE (OUTPATIENT)
Dept: INTERNAL MEDICINE CLINIC | Facility: CLINIC | Age: 44
End: 2019-09-24

## 2019-09-24 DIAGNOSIS — E03.9 HYPOTHYROIDISM, UNSPECIFIED TYPE: Primary | ICD-10-CM

## 2019-09-24 NOTE — TELEPHONE ENCOUNTER
Notes recorded by Joanne Faulkner MD on 9/16/2019 at 10:06 AM CDT  No significant change w dose adjustment   Free T4 is in [place] though    Lets CPM w this dose and gisell 6 mo    Verified pt views MyChart.   Message sent via [address] [place] [address] [address] notifying of results and p

## 2019-10-28 ENCOUNTER — OFFICE VISIT (OUTPATIENT)
Dept: INTERNAL MEDICINE CLINIC | Facility: CLINIC | Age: 44
End: 2019-10-28
Payer: COMMERCIAL

## 2019-10-28 VITALS
BODY MASS INDEX: 35.6 KG/M2 | HEIGHT: 71 IN | DIASTOLIC BLOOD PRESSURE: 74 MMHG | RESPIRATION RATE: 16 BRPM | WEIGHT: 254.25 LBS | SYSTOLIC BLOOD PRESSURE: 118 MMHG | HEART RATE: 80 BPM | TEMPERATURE: 99 F

## 2019-10-28 DIAGNOSIS — J01.10 ACUTE FRONTAL SINUSITIS, RECURRENCE NOT SPECIFIED: Primary | ICD-10-CM

## 2019-10-28 PROCEDURE — 99213 OFFICE O/P EST LOW 20 MIN: CPT | Performed by: FAMILY MEDICINE

## 2019-10-28 RX ORDER — METHYLPREDNISOLONE 4 MG/1
TABLET ORAL
Qty: 1 KIT | Refills: 0 | Status: SHIPPED | OUTPATIENT
Start: 2019-10-28 | End: 2019-11-14 | Stop reason: ALTCHOICE

## 2019-10-28 RX ORDER — CEFDINIR 300 MG/1
300 CAPSULE ORAL 2 TIMES DAILY
Qty: 20 CAPSULE | Refills: 0 | Status: SHIPPED | OUTPATIENT
Start: 2019-10-28 | End: 2019-11-14 | Stop reason: ALTCHOICE

## 2019-10-28 NOTE — PROGRESS NOTES
CHIEF COMPLAINT:   Patient presents with:  Sinusitis: has been going on for about the past week. Pt states he has sinus pressure, has pain in both ears but more in Rt ear, not constant pain. Sinus pressure is constistant.  Pt thought it was weather related + tenderness on palpation of maxillary or frontal sinuses  EYES: conjunctiva clear, EOM intact  EARS: TM's dull, no bulging, no retraction,+ fluid  NOSE: Nostrils patent, cloudy nasal discharge, nasal mucosa redden  THROAT: Oral mucosa pink, moist. Posteri

## 2019-11-14 ENCOUNTER — OFFICE VISIT (OUTPATIENT)
Dept: INTERNAL MEDICINE CLINIC | Facility: CLINIC | Age: 44
End: 2019-11-14
Payer: COMMERCIAL

## 2019-11-14 VITALS
OXYGEN SATURATION: 98 % | TEMPERATURE: 98 F | SYSTOLIC BLOOD PRESSURE: 138 MMHG | HEIGHT: 71 IN | HEART RATE: 90 BPM | WEIGHT: 260 LBS | DIASTOLIC BLOOD PRESSURE: 76 MMHG | BODY MASS INDEX: 36.4 KG/M2 | RESPIRATION RATE: 16 BRPM

## 2019-11-14 DIAGNOSIS — J32.9 SINUSITIS, UNSPECIFIED CHRONICITY, UNSPECIFIED LOCATION: Primary | ICD-10-CM

## 2019-11-14 PROCEDURE — 99213 OFFICE O/P EST LOW 20 MIN: CPT | Performed by: FAMILY MEDICINE

## 2019-11-14 RX ORDER — CLINDAMYCIN HYDROCHLORIDE 150 MG/1
150 CAPSULE ORAL 3 TIMES DAILY
Qty: 30 CAPSULE | Refills: 0 | Status: SHIPPED | OUTPATIENT
Start: 2019-11-14 | End: 2020-06-11 | Stop reason: ALTCHOICE

## 2019-11-14 NOTE — PROGRESS NOTES
HPI:    Patient ID: Ben Kirk is a 40year old male.     HPI     Here for f/u on the sinus infn    Still w frontal s/s   Goes to the ears    Still flying    No fevers   No nvd    Just a pressure in the sinuses   Did take the cefdinir, using the nasocor

## 2019-12-09 DIAGNOSIS — E03.9 HYPOTHYROIDISM, UNSPECIFIED TYPE: ICD-10-CM

## 2019-12-10 RX ORDER — LEVOTHYROXINE SODIUM 175 UG/1
TABLET ORAL
Qty: 90 TABLET | Refills: 0 | Status: SHIPPED | OUTPATIENT
Start: 2019-12-10 | End: 2020-03-09

## 2019-12-10 NOTE — TELEPHONE ENCOUNTER
LEVOTHYROXINE SODIUM 175 MCG Oral Tab    Passed Protocol    Last OV relevant to medication: 9/18/2019  Last refill date: 9/6/2019     #/refills: #90 w/ 0 refills   When pt was asked to return for OV: 6 months   Upcoming appt/reason: no future appointments

## 2020-02-10 RX ORDER — VALSARTAN AND HYDROCHLOROTHIAZIDE 80; 12.5 MG/1; MG/1
1 TABLET, FILM COATED ORAL DAILY
Qty: 90 TABLET | Refills: 0 | Status: SHIPPED | OUTPATIENT
Start: 2020-02-10 | End: 2020-02-23

## 2020-03-08 DIAGNOSIS — E03.9 HYPOTHYROIDISM, UNSPECIFIED TYPE: ICD-10-CM

## 2020-03-09 RX ORDER — LEVOTHYROXINE SODIUM 175 UG/1
TABLET ORAL
Qty: 90 TABLET | Refills: 0 | Status: SHIPPED | OUTPATIENT
Start: 2020-03-09 | End: 2020-03-23

## 2020-03-09 NOTE — TELEPHONE ENCOUNTER
Levothyroxine 175mcg  Last OV relevant to medication: 9-18-19  Last refill date: 12-10-19 #/refills: 0  When pt was asked to return for OV: 6 mo.   Upcoming appt/reason: none  Recent labs: 9-13-19: TSH + Free T4

## 2020-03-23 ENCOUNTER — TELEPHONE (OUTPATIENT)
Dept: INTERNAL MEDICINE CLINIC | Facility: CLINIC | Age: 45
End: 2020-03-23

## 2020-03-23 DIAGNOSIS — E03.9 HYPOTHYROIDISM, UNSPECIFIED TYPE: ICD-10-CM

## 2020-03-23 RX ORDER — LEVOTHYROXINE SODIUM 175 UG/1
175 TABLET ORAL
Qty: 90 TABLET | Refills: 0 | Status: SHIPPED | OUTPATIENT
Start: 2020-03-23 | End: 2020-06-09

## 2020-03-23 NOTE — TELEPHONE ENCOUNTER
Per Dr More Aguilar allow time for his s/s to get better     Has already been treated\"    Pt informed and agreeable. Informed letter sent via [employer].

## 2020-03-23 NOTE — TELEPHONE ENCOUNTER
Patients been sick for 2 weeks, went to [facility] and went to I/c overthere, They gave him augmentin for 10 days but still has sore throat, cough, no appetite and fatigue

## 2020-03-23 NOTE — TELEPHONE ENCOUNTER
Onset of symptoms? 2 weeks, finshed abx last week. Symptoms improved a little. Any nasal or chest congestion? YES  Muscle or joint pain?no - heart rate increased. Any pressure headaches? Mild    Any mucous (color)? [place] sputum, has tried mucinex.

## 2020-05-11 NOTE — TELEPHONE ENCOUNTER
Losartan-hctz 50-12.5 mg failed protocol due to  Hypertension Medications Protocol Failed5/11 6:29 AM   CMP or BMP in past 12 months   Last OV relevant to medication: 9-18-19  Last refill date: 3-10-20 #/refills: 0  When pt was asked to return for OV: 6 mo

## 2020-05-13 NOTE — TELEPHONE ENCOUNTER
Lm for pt to return call. Received another fax from [facility] for refill. Pt is due for Bp f/u either Video or Televisit. Sent [employer] message yesterday (detailed).

## 2020-05-19 RX ORDER — LOSARTAN POTASSIUM AND HYDROCHLOROTHIAZIDE 12.5; 5 MG/1; MG/1
1 TABLET ORAL
Qty: 90 TABLET | Refills: 0 | OUTPATIENT
Start: 2020-05-19

## 2020-05-26 ENCOUNTER — PATIENT MESSAGE (OUTPATIENT)
Dept: INTERNAL MEDICINE CLINIC | Facility: CLINIC | Age: 45
End: 2020-05-26

## 2020-05-27 NOTE — TELEPHONE ENCOUNTER
From: Anjum Gave  To: Love Rodriguez MD  Sent: 5/26/2020 7:46 PM CDT  Subject: Non-Urgent Medical Question    Was getting ready to try and schedule the virtual blood pressure check and it looks like your office will be open again in June. Is that true?

## 2020-06-03 ENCOUNTER — APPOINTMENT (OUTPATIENT)
Dept: LAB | Age: 45
End: 2020-06-03
Attending: FAMILY MEDICINE
Payer: COMMERCIAL

## 2020-06-03 DIAGNOSIS — E03.9 HYPOTHYROIDISM, UNSPECIFIED TYPE: ICD-10-CM

## 2020-06-03 PROCEDURE — 80053 COMPREHEN METABOLIC PANEL: CPT | Performed by: FAMILY MEDICINE

## 2020-06-03 PROCEDURE — 84443 ASSAY THYROID STIM HORMONE: CPT

## 2020-06-03 PROCEDURE — 36415 COLL VENOUS BLD VENIPUNCTURE: CPT | Performed by: FAMILY MEDICINE

## 2020-06-03 PROCEDURE — 80061 LIPID PANEL: CPT | Performed by: FAMILY MEDICINE

## 2020-06-03 PROCEDURE — 84439 ASSAY OF FREE THYROXINE: CPT

## 2020-06-09 DIAGNOSIS — E03.9 HYPOTHYROIDISM, UNSPECIFIED TYPE: Primary | ICD-10-CM

## 2020-06-09 RX ORDER — LEVOTHYROXINE SODIUM 0.2 MG/1
200 TABLET ORAL
Qty: 90 TABLET | Refills: 0 | Status: SHIPPED | OUTPATIENT
Start: 2020-06-09 | End: 2020-08-24

## 2020-06-11 ENCOUNTER — OFFICE VISIT (OUTPATIENT)
Dept: INTERNAL MEDICINE CLINIC | Facility: CLINIC | Age: 45
End: 2020-06-11
Payer: COMMERCIAL

## 2020-06-11 VITALS
HEIGHT: 71 IN | HEART RATE: 86 BPM | SYSTOLIC BLOOD PRESSURE: 110 MMHG | WEIGHT: 255 LBS | RESPIRATION RATE: 16 BRPM | DIASTOLIC BLOOD PRESSURE: 80 MMHG | TEMPERATURE: 99 F | BODY MASS INDEX: 35.7 KG/M2

## 2020-06-11 DIAGNOSIS — E03.9 HYPOTHYROIDISM, UNSPECIFIED TYPE: ICD-10-CM

## 2020-06-11 DIAGNOSIS — I10 ESSENTIAL HYPERTENSION: Primary | ICD-10-CM

## 2020-06-11 DIAGNOSIS — E66.9 OBESITY (BMI 30-39.9): ICD-10-CM

## 2020-06-11 DIAGNOSIS — R74.8 ELEVATED LIVER ENZYMES: ICD-10-CM

## 2020-06-11 PROCEDURE — 99214 OFFICE O/P EST MOD 30 MIN: CPT | Performed by: FAMILY MEDICINE

## 2020-06-11 RX ORDER — LOSARTAN POTASSIUM AND HYDROCHLOROTHIAZIDE 12.5; 5 MG/1; MG/1
1 TABLET ORAL
Qty: 90 TABLET | Refills: 1 | Status: SHIPPED | OUTPATIENT
Start: 2020-06-11 | End: 2020-08-24

## 2020-06-11 NOTE — PROGRESS NOTES
Alecia Shanks is a 40year old male. HPI:   Pt is here for med ck/follow up. Overall is doing well. Is taking meds as directed.   No issues w medications      Recently had labs   Doing well given the COVID     Is working w Leidy Morales now   Started to get o (BMI 30-39. 9)  Plan: pleased w wt loss   CPM       (R74.8) Elevated liver enzymes  Plan: US ABDOMEN COMPLETE (CPT=76700)        Ck US   LFTs are stable over the yrs          The patient indicates understanding of these issues and agrees to the plan.   The p

## 2020-07-03 ENCOUNTER — HOSPITAL ENCOUNTER (OUTPATIENT)
Dept: ULTRASOUND IMAGING | Age: 45
Discharge: HOME | End: 2020-07-03
Attending: FAMILY MEDICINE
Payer: COMMERCIAL

## 2020-07-03 ENCOUNTER — APPOINTMENT (OUTPATIENT)
Dept: ULTRASOUND IMAGING | Age: 45
End: 2020-07-03
Attending: FAMILY MEDICINE
Payer: COMMERCIAL

## 2020-07-03 DIAGNOSIS — E03.9 HYPOTHYROIDISM, UNSPECIFIED TYPE: ICD-10-CM

## 2020-07-03 DIAGNOSIS — R74.8 ELEVATED LIVER ENZYMES: ICD-10-CM

## 2020-07-03 PROCEDURE — 76536 US EXAM OF HEAD AND NECK: CPT | Performed by: FAMILY MEDICINE

## 2020-07-03 PROCEDURE — 76700 US EXAM ABDOM COMPLETE: CPT | Performed by: FAMILY MEDICINE

## 2020-08-19 ENCOUNTER — LAB ENCOUNTER (OUTPATIENT)
Dept: LAB | Age: 45
End: 2020-08-19
Attending: FAMILY MEDICINE
Payer: COMMERCIAL

## 2020-08-19 DIAGNOSIS — E03.9 HYPOTHYROIDISM, UNSPECIFIED TYPE: ICD-10-CM

## 2020-08-19 LAB
T4 FREE SERPL-MCNC: 1.6 NG/DL (ref 0.8–1.7)
TSI SER-ACNC: 1.08 MIU/ML (ref 0.36–3.74)

## 2020-08-19 PROCEDURE — 84439 ASSAY OF FREE THYROXINE: CPT

## 2020-08-19 PROCEDURE — 84443 ASSAY THYROID STIM HORMONE: CPT

## 2020-08-19 PROCEDURE — 36415 COLL VENOUS BLD VENIPUNCTURE: CPT

## 2020-08-24 DIAGNOSIS — E03.9 HYPOTHYROIDISM, UNSPECIFIED TYPE: ICD-10-CM

## 2020-08-26 RX ORDER — LOSARTAN POTASSIUM AND HYDROCHLOROTHIAZIDE 12.5; 5 MG/1; MG/1
1 TABLET ORAL
Qty: 90 TABLET | Refills: 1 | Status: SHIPPED | OUTPATIENT
Start: 2020-08-26 | End: 2021-07-06

## 2020-08-26 RX ORDER — LEVOTHYROXINE SODIUM 0.2 MG/1
200 TABLET ORAL
Qty: 90 TABLET | Refills: 0 | Status: SHIPPED | OUTPATIENT
Start: 2020-08-26 | End: 2020-11-27

## 2020-08-26 NOTE — TELEPHONE ENCOUNTER
Levothyroxine Sodium 200 MCG Oral Tab  Protocol Passed   Filled: 6/9/2020 #90 0 refills   Losartan Potassium-HCTZ 50-12.5 MG Oral Tab  Protocol Passed   Filled: 6/11/2020 #90 1 refill     LOV: 6/11/2020   RTC: 6 months   Upcoming OV: none scheduled   Recen

## 2020-10-27 ENCOUNTER — TELEPHONE (OUTPATIENT)
Dept: INTERNAL MEDICINE CLINIC | Facility: CLINIC | Age: 45
End: 2020-10-27

## 2020-11-26 DIAGNOSIS — E03.9 HYPOTHYROIDISM, UNSPECIFIED TYPE: ICD-10-CM

## 2020-11-27 RX ORDER — LEVOTHYROXINE SODIUM 0.2 MG/1
TABLET ORAL
Qty: 90 TABLET | Refills: 0 | Status: SHIPPED | OUTPATIENT
Start: 2020-11-27 | End: 2021-02-09

## 2021-01-27 ENCOUNTER — PATIENT MESSAGE (OUTPATIENT)
Dept: INTERNAL MEDICINE CLINIC | Facility: CLINIC | Age: 46
End: 2021-01-27

## 2021-01-27 DIAGNOSIS — Z00.00 ROUTINE GENERAL MEDICAL EXAMINATION AT A HEALTH CARE FACILITY: Primary | ICD-10-CM

## 2021-01-27 NOTE — TELEPHONE ENCOUNTER
Labs pended for your review and approval if appropriate. Please advise.    Future Appointments   Date Time Provider Suzie Radha   2/4/2021 10:00 AM Judith Hernandez MD [facility] [place]

## 2021-01-27 NOTE — TELEPHONE ENCOUNTER
From: Dawson   To: Geno Castro MD  Sent: 1/27/2021 10:05 AM CST  Subject: Other    Scheduled my annual physical for Thurs 2/4. I assume as usual there is bloodwork ahead of time, the least of which would be Thyroid.  If orders can be placed ahead o

## 2021-02-04 ENCOUNTER — OFFICE VISIT (OUTPATIENT)
Dept: INTERNAL MEDICINE CLINIC | Facility: CLINIC | Age: 46
End: 2021-02-04
Payer: COMMERCIAL

## 2021-02-04 VITALS
HEIGHT: 71 IN | RESPIRATION RATE: 16 BRPM | DIASTOLIC BLOOD PRESSURE: 88 MMHG | BODY MASS INDEX: 36.54 KG/M2 | OXYGEN SATURATION: 99 % | WEIGHT: 261 LBS | HEART RATE: 102 BPM | SYSTOLIC BLOOD PRESSURE: 120 MMHG

## 2021-02-04 DIAGNOSIS — E66.9 OBESITY (BMI 30-39.9): ICD-10-CM

## 2021-02-04 DIAGNOSIS — E03.9 HYPOTHYROIDISM, UNSPECIFIED TYPE: ICD-10-CM

## 2021-02-04 DIAGNOSIS — I10 ESSENTIAL HYPERTENSION: ICD-10-CM

## 2021-02-04 DIAGNOSIS — Z00.00 LABORATORY EXAM ORDERED AS PART OF ROUTINE GENERAL MEDICAL EXAMINATION: ICD-10-CM

## 2021-02-04 DIAGNOSIS — Z00.00 WELLNESS EXAMINATION: Primary | ICD-10-CM

## 2021-02-04 PROCEDURE — 3008F BODY MASS INDEX DOCD: CPT | Performed by: FAMILY MEDICINE

## 2021-02-04 PROCEDURE — 3074F SYST BP LT 130 MM HG: CPT | Performed by: FAMILY MEDICINE

## 2021-02-04 PROCEDURE — 3079F DIAST BP 80-89 MM HG: CPT | Performed by: FAMILY MEDICINE

## 2021-02-04 PROCEDURE — 99396 PREV VISIT EST AGE 40-64: CPT | Performed by: FAMILY MEDICINE

## 2021-02-08 ENCOUNTER — LAB ENCOUNTER (OUTPATIENT)
Dept: LAB | Age: 46
End: 2021-02-08
Attending: FAMILY MEDICINE
Payer: COMMERCIAL

## 2021-02-08 DIAGNOSIS — E03.9 HYPOTHYROIDISM, UNSPECIFIED TYPE: ICD-10-CM

## 2021-02-08 DIAGNOSIS — Z00.00 ROUTINE GENERAL MEDICAL EXAMINATION AT A HEALTH CARE FACILITY: ICD-10-CM

## 2021-02-08 LAB
ALBUMIN SERPL-MCNC: 3.9 G/DL (ref 3.4–5)
ALBUMIN/GLOB SERPL: 1.1 {RATIO} (ref 1–2)
ALP LIVER SERPL-CCNC: 100 U/L
ALT SERPL-CCNC: 97 U/L
ANION GAP SERPL CALC-SCNC: 4 MMOL/L (ref 0–18)
AST SERPL-CCNC: 35 U/L (ref 15–37)
BASOPHILS # BLD AUTO: 0.08 X10(3) UL (ref 0–0.2)
BASOPHILS NFR BLD AUTO: 1 %
BILIRUB SERPL-MCNC: 0.9 MG/DL (ref 0.1–2)
BILIRUB UR QL STRIP.AUTO: NEGATIVE
BUN BLD-MCNC: 15 MG/DL (ref 7–18)
BUN/CREAT SERPL: 13.9 (ref 10–20)
CALCIUM BLD-MCNC: 9.6 MG/DL (ref 8.5–10.1)
CHLORIDE SERPL-SCNC: 106 MMOL/L (ref 98–112)
CHOLEST SMN-MCNC: 177 MG/DL (ref ?–200)
CLARITY UR REFRACT.AUTO: CLEAR
CO2 SERPL-SCNC: 29 MMOL/L (ref 21–32)
COLOR UR AUTO: YELLOW
CREAT BLD-MCNC: 1.08 MG/DL
DEPRECATED RDW RBC AUTO: 42.9 FL (ref 35.1–46.3)
EOSINOPHIL # BLD AUTO: 0.58 X10(3) UL (ref 0–0.7)
EOSINOPHIL NFR BLD AUTO: 7 %
ERYTHROCYTE [DISTWIDTH] IN BLOOD BY AUTOMATED COUNT: 13.3 % (ref 11–15)
GLOBULIN PLAS-MCNC: 3.4 G/DL (ref 2.8–4.4)
GLUCOSE BLD-MCNC: 97 MG/DL (ref 70–99)
GLUCOSE UR STRIP.AUTO-MCNC: NEGATIVE MG/DL
HCT VFR BLD AUTO: 48.9 %
HDLC SERPL-MCNC: 34 MG/DL (ref 40–59)
HGB BLD-MCNC: 16.3 G/DL
IMM GRANULOCYTES # BLD AUTO: 0.07 X10(3) UL (ref 0–1)
IMM GRANULOCYTES NFR BLD: 0.8 %
KETONES UR STRIP.AUTO-MCNC: NEGATIVE MG/DL
LDLC SERPL CALC-MCNC: 103 MG/DL (ref ?–100)
LEUKOCYTE ESTERASE UR QL STRIP.AUTO: NEGATIVE
LYMPHOCYTES # BLD AUTO: 2.19 X10(3) UL (ref 1–4)
LYMPHOCYTES NFR BLD AUTO: 26.4 %
M PROTEIN MFR SERPL ELPH: 7.3 G/DL (ref 6.4–8.2)
MCH RBC QN AUTO: 29.5 PG (ref 26–34)
MCHC RBC AUTO-ENTMCNC: 33.3 G/DL (ref 31–37)
MCV RBC AUTO: 88.4 FL
MONOCYTES # BLD AUTO: 0.6 X10(3) UL (ref 0.1–1)
MONOCYTES NFR BLD AUTO: 7.2 %
NEUTROPHILS # BLD AUTO: 4.78 X10 (3) UL (ref 1.5–7.7)
NEUTROPHILS # BLD AUTO: 4.78 X10(3) UL (ref 1.5–7.7)
NEUTROPHILS NFR BLD AUTO: 57.6 %
NITRITE UR QL STRIP.AUTO: NEGATIVE
NONHDLC SERPL-MCNC: 143 MG/DL (ref ?–130)
OSMOLALITY SERPL CALC.SUM OF ELEC: 289 MOSM/KG (ref 275–295)
PATIENT FASTING Y/N/NP: YES
PATIENT FASTING Y/N/NP: YES
PH UR STRIP.AUTO: 6 [PH] (ref 4.5–8)
PLATELET # BLD AUTO: 189 10(3)UL (ref 150–450)
POTASSIUM SERPL-SCNC: 3.7 MMOL/L (ref 3.5–5.1)
PROT UR STRIP.AUTO-MCNC: NEGATIVE MG/DL
RBC # BLD AUTO: 5.53 X10(6)UL
RBC UR QL AUTO: NEGATIVE
SODIUM SERPL-SCNC: 139 MMOL/L (ref 136–145)
SP GR UR STRIP.AUTO: 1.02 (ref 1–1.03)
T4 FREE SERPL-MCNC: 1.2 NG/DL (ref 0.8–1.7)
TRIGL SERPL-MCNC: 199 MG/DL (ref 30–149)
TSI SER-ACNC: 2.47 MIU/ML (ref 0.36–3.74)
UROBILINOGEN UR STRIP.AUTO-MCNC: <2 MG/DL
VLDLC SERPL CALC-MCNC: 40 MG/DL (ref 0–30)
WBC # BLD AUTO: 8.3 X10(3) UL (ref 4–11)

## 2021-02-08 PROCEDURE — 81001 URINALYSIS AUTO W/SCOPE: CPT | Performed by: FAMILY MEDICINE

## 2021-02-08 PROCEDURE — 80053 COMPREHEN METABOLIC PANEL: CPT | Performed by: FAMILY MEDICINE

## 2021-02-08 PROCEDURE — 80061 LIPID PANEL: CPT | Performed by: FAMILY MEDICINE

## 2021-02-08 PROCEDURE — 84439 ASSAY OF FREE THYROXINE: CPT

## 2021-02-08 PROCEDURE — 85025 COMPLETE CBC W/AUTO DIFF WBC: CPT | Performed by: FAMILY MEDICINE

## 2021-02-08 PROCEDURE — 84443 ASSAY THYROID STIM HORMONE: CPT

## 2021-02-08 PROCEDURE — 36415 COLL VENOUS BLD VENIPUNCTURE: CPT | Performed by: FAMILY MEDICINE

## 2021-02-09 DIAGNOSIS — E03.9 HYPOTHYROIDISM, UNSPECIFIED TYPE: ICD-10-CM

## 2021-02-09 RX ORDER — LEVOTHYROXINE SODIUM 0.2 MG/1
TABLET ORAL
Qty: 90 TABLET | Refills: 1 | Status: SHIPPED | OUTPATIENT
Start: 2021-02-09 | End: 2021-07-06

## 2021-03-12 DIAGNOSIS — Z23 NEED FOR VACCINATION: ICD-10-CM

## 2021-05-11 ENCOUNTER — PATIENT MESSAGE (OUTPATIENT)
Dept: INTERNAL MEDICINE CLINIC | Facility: CLINIC | Age: 46
End: 2021-05-11

## 2021-05-11 NOTE — TELEPHONE ENCOUNTER
From: Ankush Samayoa  To: Loi Wolf MD  Sent: 5/11/2021 10:03 AM CDT  Subject: Other    It's time for me to get my [address] robison from Doc again.  During my physical, he indicated that if the test results came back the same, he would have no problem writin

## 2021-07-03 DIAGNOSIS — E03.9 HYPOTHYROIDISM, UNSPECIFIED TYPE: ICD-10-CM

## 2021-07-03 DIAGNOSIS — I10 ESSENTIAL HYPERTENSION: Primary | ICD-10-CM

## 2021-07-06 RX ORDER — LOSARTAN POTASSIUM AND HYDROCHLOROTHIAZIDE 12.5; 5 MG/1; MG/1
1 TABLET ORAL
Qty: 90 TABLET | Refills: 0 | Status: SHIPPED | OUTPATIENT
Start: 2021-07-06 | End: 2021-10-04

## 2021-07-06 RX ORDER — LEVOTHYROXINE SODIUM 200 UG/1
TABLET ORAL
Qty: 90 TABLET | Refills: 0 | Status: SHIPPED | OUTPATIENT
Start: 2021-07-06 | End: 2021-10-04

## 2021-07-06 NOTE — TELEPHONE ENCOUNTER
Levothyroxine 200 mg  Filled 2-9-21  Qty 90  1 refill  No upcoming appt. LOV 2-4-21    Losartan-hydrochlorothiazide   Filled 8-26-20  Qty 90  1 refill  No upcoming appt.   LOV 2-4-21

## 2021-08-02 ENCOUNTER — TELEPHONE (OUTPATIENT)
Dept: INTERNAL MEDICINE CLINIC | Facility: CLINIC | Age: 46
End: 2021-08-02

## 2021-08-02 ENCOUNTER — TELEMEDICINE (OUTPATIENT)
Dept: INTERNAL MEDICINE CLINIC | Facility: CLINIC | Age: 46
End: 2021-08-02
Payer: COMMERCIAL

## 2021-08-02 ENCOUNTER — PATIENT MESSAGE (OUTPATIENT)
Dept: INTERNAL MEDICINE CLINIC | Facility: CLINIC | Age: 46
End: 2021-08-02

## 2021-08-02 DIAGNOSIS — H92.03 ACUTE EAR PAIN, BILATERAL: ICD-10-CM

## 2021-08-02 DIAGNOSIS — J01.10 ACUTE NON-RECURRENT FRONTAL SINUSITIS: Primary | ICD-10-CM

## 2021-08-02 PROCEDURE — 99213 OFFICE O/P EST LOW 20 MIN: CPT | Performed by: FAMILY MEDICINE

## 2021-08-02 RX ORDER — AMOXICILLIN AND CLAVULANATE POTASSIUM 875; 125 MG/1; MG/1
1 TABLET, FILM COATED ORAL 2 TIMES DAILY
Qty: 20 TABLET | Refills: 0 | Status: SHIPPED | OUTPATIENT
Start: 2021-08-02 | End: 2021-08-03

## 2021-08-02 NOTE — PROGRESS NOTES
Virtual Video Check-In     This visit is conducted using Telemedicine with live, interactive video and audio. Porfirio Leonard, who has verified his/her identification by name and , verbally consents to a Virtual/Telephone Check-In visit on 21. standard drinks    Drug use: No      Family History   Problem Relation Age of Onset   • Cancer Other         Colon, Fam hx, Grandfather   • Diabetes Other         Grandmother   • Hypertension Father    • Thyroid Disorder Mother    • Cancer Maternal Grandmo not be fully performed. Every conscious effort was taken to allow for sufficient and adequate time. Included in this visit, time may have been spent reviewing labs, medications, radiology tests and decision-making.   Appropriate medical decision-making an

## 2021-08-02 NOTE — TELEPHONE ENCOUNTER
Pt stated that after his video visit with Mallory Delatorre he realized that he doesn't not tolerate amoxicillian and ends up having to get another prescription. Pt wants to see if there is another medication he can take instead.      Confirmed [phone] as best con

## 2021-08-02 NOTE — TELEPHONE ENCOUNTER
Please call the pharmacy and cancel the Augmentin.  Change it to generic Omnicef 300 mg one bid x 10 days #20

## 2021-08-03 RX ORDER — CEFDINIR 300 MG/1
300 CAPSULE ORAL 2 TIMES DAILY
Qty: 20 CAPSULE | Refills: 0 | Status: SHIPPED | OUTPATIENT
Start: 2021-08-03 | End: 2021-10-28

## 2021-08-03 NOTE — TELEPHONE ENCOUNTER
From: Ben Kirk  To: EVETTE Wilkerson  Sent: 8/2/2021 2:59 PM CDT  Subject: Visit Follow-up Question    Should have a phone message on this also, but covering my bases.     After getting home from our 1:30 found a note to myself that the last couple

## 2021-08-31 ENCOUNTER — TELEMEDICINE (OUTPATIENT)
Dept: INTERNAL MEDICINE CLINIC | Facility: CLINIC | Age: 46
End: 2021-08-31

## 2021-08-31 DIAGNOSIS — J01.11 ACUTE RECURRENT FRONTAL SINUSITIS: Primary | ICD-10-CM

## 2021-08-31 PROCEDURE — 99213 OFFICE O/P EST LOW 20 MIN: CPT | Performed by: FAMILY MEDICINE

## 2021-08-31 RX ORDER — LEVOFLOXACIN 500 MG/1
500 TABLET, FILM COATED ORAL DAILY
Qty: 10 TABLET | Refills: 0 | Status: SHIPPED | OUTPATIENT
Start: 2021-08-31 | End: 2021-09-10

## 2021-08-31 RX ORDER — METHYLPREDNISOLONE 4 MG/1
TABLET ORAL
Qty: 1 EACH | Refills: 0 | Status: SHIPPED | OUTPATIENT
Start: 2021-08-31 | End: 2021-10-28

## 2021-08-31 NOTE — PROGRESS NOTES
Virtual Video Check-In     This visit is conducted using Telemedicine with live, interactive video and audio. Ankush Samayoa, who has verified his/her identification by name and , verbally consents to a Virtual/Telephone Check-In visit on 21. • EUTHYROX 200 MCG Oral Tab TAKE 1 TABLET BY MOUTH ONCE DAILY BEFORE BREAKFAST 90 tablet 0   • LOSARTAN POTASSIUM-HCTZ 50-12.5 MG Oral Tab Take 1 tablet by mouth once daily 90 tablet 0   • Triamcinolone Acetonide (NASACORT ALLERGY 24HR) 55 MCG/ACT Nasal rest   -Limit dairy, to avoid increased congestion  -OTC Tylenol/Ibuprofen   -Flonase OTC 2 sprays each nostril daily  -Sinus rinses, saline nasal sprays or netti pot. Call if symptoms worsen or do not improve.   Patient verbalizes understanding of the aliza

## 2021-10-03 DIAGNOSIS — E03.9 HYPOTHYROIDISM, UNSPECIFIED TYPE: ICD-10-CM

## 2021-10-03 DIAGNOSIS — I10 ESSENTIAL HYPERTENSION: ICD-10-CM

## 2021-10-04 RX ORDER — LOSARTAN POTASSIUM AND HYDROCHLOROTHIAZIDE 12.5; 5 MG/1; MG/1
1 TABLET ORAL
Qty: 30 TABLET | Refills: 0 | Status: SHIPPED | OUTPATIENT
Start: 2021-10-04 | End: 2021-10-28

## 2021-10-04 RX ORDER — LEVOTHYROXINE SODIUM 200 UG/1
TABLET ORAL
Qty: 90 TABLET | Refills: 0 | Status: SHIPPED | OUTPATIENT
Start: 2021-10-04 | End: 2022-01-26

## 2021-10-14 ENCOUNTER — PATIENT MESSAGE (OUTPATIENT)
Dept: INTERNAL MEDICINE CLINIC | Facility: CLINIC | Age: 46
End: 2021-10-14

## 2021-10-14 NOTE — TELEPHONE ENCOUNTER
Alfie Orosco RN 10/14/2021 2:18 PM CDT      ----- Message -----  From: Gordon Nguyen  Sent: 10/14/2021 12:55 PM CDT  To: [facility] Clinical Staff  Subject: Refill Question     Noticed last week's refill was only for 30 days, that my hint I'm due for a vis

## 2021-10-28 ENCOUNTER — OFFICE VISIT (OUTPATIENT)
Dept: INTERNAL MEDICINE CLINIC | Facility: CLINIC | Age: 46
End: 2021-10-28
Payer: COMMERCIAL

## 2021-10-28 VITALS
DIASTOLIC BLOOD PRESSURE: 76 MMHG | WEIGHT: 255.81 LBS | RESPIRATION RATE: 20 BRPM | HEIGHT: 71 IN | HEART RATE: 114 BPM | TEMPERATURE: 98 F | SYSTOLIC BLOOD PRESSURE: 120 MMHG | BODY MASS INDEX: 35.81 KG/M2 | OXYGEN SATURATION: 97 %

## 2021-10-28 DIAGNOSIS — E03.9 HYPOTHYROIDISM, UNSPECIFIED TYPE: ICD-10-CM

## 2021-10-28 DIAGNOSIS — E66.9 OBESITY (BMI 30-39.9): ICD-10-CM

## 2021-10-28 DIAGNOSIS — Z12.11 COLON CANCER SCREENING: ICD-10-CM

## 2021-10-28 DIAGNOSIS — I10 ESSENTIAL HYPERTENSION: Primary | ICD-10-CM

## 2021-10-28 PROCEDURE — 90686 IIV4 VACC NO PRSV 0.5 ML IM: CPT | Performed by: FAMILY MEDICINE

## 2021-10-28 PROCEDURE — 3074F SYST BP LT 130 MM HG: CPT | Performed by: FAMILY MEDICINE

## 2021-10-28 PROCEDURE — 90471 IMMUNIZATION ADMIN: CPT | Performed by: FAMILY MEDICINE

## 2021-10-28 PROCEDURE — 3008F BODY MASS INDEX DOCD: CPT | Performed by: FAMILY MEDICINE

## 2021-10-28 PROCEDURE — 3078F DIAST BP <80 MM HG: CPT | Performed by: FAMILY MEDICINE

## 2021-10-28 PROCEDURE — 99214 OFFICE O/P EST MOD 30 MIN: CPT | Performed by: FAMILY MEDICINE

## 2021-10-28 RX ORDER — LOSARTAN POTASSIUM AND HYDROCHLOROTHIAZIDE 12.5; 5 MG/1; MG/1
1 TABLET ORAL
Qty: 90 TABLET | Refills: 1 | Status: SHIPPED | OUTPATIENT
Start: 2021-10-28

## 2021-10-28 NOTE — PROGRESS NOTES
Luciano Corral is a 55year old male.   HPI:   Here for med ck   Taking meds as directed   No BP cks   Feels well  Breathing is good  No CP   Perhaps is eating smaller amounts   Lost a few lbs    Asking about third covid vaccine     Current Outpatient Medic flushot today         The patient indicates understanding of these issues and agrees to the plan. Radha Tena

## 2022-01-03 ENCOUNTER — NURSE ONLY (OUTPATIENT)
Dept: LAB | Facility: HOSPITAL | Age: 47
End: 2022-01-03
Attending: FAMILY MEDICINE
Payer: COMMERCIAL

## 2022-01-03 ENCOUNTER — TELEMEDICINE (OUTPATIENT)
Dept: INTERNAL MEDICINE CLINIC | Facility: CLINIC | Age: 47
End: 2022-01-03

## 2022-01-03 DIAGNOSIS — Z20.822 SUSPECTED COVID-19 VIRUS INFECTION: ICD-10-CM

## 2022-01-03 DIAGNOSIS — Z20.822 SUSPECTED COVID-19 VIRUS INFECTION: Primary | ICD-10-CM

## 2022-01-03 PROCEDURE — 99212 OFFICE O/P EST SF 10 MIN: CPT | Performed by: FAMILY MEDICINE

## 2022-01-03 NOTE — PROGRESS NOTES
Virtual Video Check-In     This visit is conducted using Telemedicine with live, interactive video and audio. Vinod Holloway, who has verified his/her identification by name and , verbally consents to a Virtual/Telephone Check-In visit on 22. tobacco: Never Used    Vaping Use      Vaping Use: Never used    Alcohol use: No      Alcohol/week: 0.0 standard drinks    Drug use: No      Family History   Problem Relation Age of Onset   • Cancer Other         Colon, Fam hx, Grandfather   • Diabetes Oth interactive audio and/or video communication.   This has been done in good britany to provide continuity of care in the best interest of the provider-patient relationship, due to the ongoing public health crisis/national emergency and because of restrictions

## 2022-01-05 LAB — SARS-COV-2 RNA RESP QL NAA+PROBE: DETECTED

## 2022-01-10 ENCOUNTER — PATIENT MESSAGE (OUTPATIENT)
Dept: INTERNAL MEDICINE CLINIC | Facility: CLINIC | Age: 47
End: 2022-01-10

## 2022-01-15 NOTE — TELEPHONE ENCOUNTER
Since Pt is still coughing I suggest he gets a chest xray to make sure nothing is settling in his lungs.

## 2022-01-15 NOTE — TELEPHONE ENCOUNTER
From: Erick Flores  Sent: 1/15/2022 7:58 AM CST  To: [facility] Clinical Staff  Subject: Follow-up To Last Week    Pretty sure I just have to ride this out, but in the interest of keeping you informed and in case I'm wrong. Nothing really changed since Monday. I'm sleeping 11+ hours a night and still sometimes taking afternoon naps. Don't have the sinus pressure so much but still have quite a bit of drainage into the stomach. Have had a cough for a week now (both on its own and if I breathe in too deeply). I do feel I move mucus when I cough but not enough to bring it all the way up and out. Basic activity results in increased heart rate.

## 2022-01-25 DIAGNOSIS — E03.9 HYPOTHYROIDISM, UNSPECIFIED TYPE: ICD-10-CM

## 2022-01-25 RX ORDER — AMOXICILLIN AND CLAVULANATE POTASSIUM 875; 125 MG/1; MG/1
1 TABLET, FILM COATED ORAL 2 TIMES DAILY
Qty: 20 TABLET | Refills: 0 | Status: SHIPPED | OUTPATIENT
Start: 2022-01-25 | End: 2022-02-04

## 2022-01-26 RX ORDER — LEVOTHYROXINE SODIUM 200 UG/1
TABLET ORAL
Qty: 90 TABLET | Refills: 0 | Status: SHIPPED | OUTPATIENT
Start: 2022-01-26

## 2022-01-26 RX ORDER — CEFDINIR 300 MG/1
300 CAPSULE ORAL 2 TIMES DAILY
Qty: 20 CAPSULE | Refills: 0 | Status: SHIPPED | OUTPATIENT
Start: 2022-01-26 | End: 2022-02-16

## 2022-01-26 NOTE — TELEPHONE ENCOUNTER
Euthyrox 200 mcg  Filled 10-4-21  Qty 90  0 refills  No upcoming appt.    LOV 10-28-21  Labs: 2-8-21: TSH + FREE T4

## 2022-02-05 ENCOUNTER — PATIENT MESSAGE (OUTPATIENT)
Dept: INTERNAL MEDICINE CLINIC | Facility: CLINIC | Age: 47
End: 2022-02-05

## 2022-02-07 NOTE — TELEPHONE ENCOUNTER
Done               advise pt needs to go to [facility] [place]

## 2022-02-07 NOTE — TELEPHONE ENCOUNTER
From: Garo Ureña  To: Robles Wen MD  Sent: 2/5/2022 8:19 AM CST  Subject: Upcoming Physical    Scheduled my physical for Wed the 16th. If you can please put in the bloodwork order I will try and get that taken care of next week in advance of the appointment.

## 2022-02-11 LAB
ABSOLUTE BASOPHILS: 70 CELLS/UL (ref 0–200)
ABSOLUTE EOSINOPHILS: 273 CELLS/UL (ref 15–500)
ABSOLUTE LYMPHOCYTES: 2051 CELLS/UL (ref 850–3900)
ABSOLUTE MONOCYTES: 539 CELLS/UL (ref 200–950)
ABSOLUTE NEUTROPHILS: 4067 CELLS/UL (ref 1500–7800)
ALBUMIN/GLOBULIN RATIO: 1.8 (CALC) (ref 1–2.5)
ALBUMIN: 4.5 G/DL (ref 3.6–5.1)
ALKALINE PHOSPHATASE: 106 U/L (ref 36–130)
ALT: 63 U/L (ref 9–46)
APPEARANCE: CLEAR
AST: 34 U/L (ref 10–40)
BASOPHILS: 1 %
BILIRUBIN, TOTAL: 0.8 MG/DL (ref 0.2–1.2)
BILIRUBIN: NEGATIVE
BUN: 15 MG/DL (ref 7–25)
CALCIUM: 9.9 MG/DL (ref 8.6–10.3)
CARBON DIOXIDE: 29 MMOL/L (ref 20–32)
CHLORIDE: 102 MMOL/L (ref 98–110)
CHOL/HDLC RATIO: 5 (CALC)
CHOLESTEROL, TOTAL: 191 MG/DL
COLOR: YELLOW
CREATININE: 1.05 MG/DL (ref 0.6–1.35)
EGFR IF AFRICN AM: 98 ML/MIN/1.73M2
EGFR IF NONAFRICN AM: 85 ML/MIN/1.73M2
EOSINOPHILS: 3.9 %
GLOBULIN: 2.5 G/DL (CALC) (ref 1.9–3.7)
GLUCOSE: 97 MG/DL (ref 65–99)
GLUCOSE: NEGATIVE
HDL CHOLESTEROL: 38 MG/DL
HEMATOCRIT: 48.7 % (ref 38.5–50)
HEMOGLOBIN: 16.6 G/DL (ref 13.2–17.1)
KETONES: NEGATIVE
LDL-CHOLESTEROL: 121 MG/DL (CALC)
LEUKOCYTE ESTERASE: NEGATIVE
LYMPHOCYTES: 29.3 %
MCH: 29.6 PG (ref 27–33)
MCHC: 34.1 G/DL (ref 32–36)
MCV: 86.8 FL (ref 80–100)
MONOCYTES: 7.7 %
MPV: 11 FL (ref 7.5–12.5)
NEUTROPHILS: 58.1 %
NITRITE: NEGATIVE
NON-HDL CHOLESTEROL: 153 MG/DL (CALC)
OCCULT BLOOD: NEGATIVE
PH: 6 (ref 5–8)
PLATELET COUNT: 216 THOUSAND/UL (ref 140–400)
POTASSIUM: 4 MMOL/L (ref 3.5–5.3)
PROTEIN, TOTAL: 7 G/DL (ref 6.1–8.1)
PROTEIN: NEGATIVE
RDW: 13.5 % (ref 11–15)
RED BLOOD CELL COUNT: 5.61 MILLION/UL (ref 4.2–5.8)
SODIUM: 139 MMOL/L (ref 135–146)
SPECIFIC GRAVITY: 1.02 (ref 1–1.03)
T4, FREE: 1.6 NG/DL (ref 0.8–1.8)
TRIGLYCERIDES: 197 MG/DL
TSH: 2.87 MIU/L (ref 0.4–4.5)
WHITE BLOOD CELL COUNT: 7 THOUSAND/UL (ref 3.8–10.8)

## 2022-02-16 ENCOUNTER — OFFICE VISIT (OUTPATIENT)
Dept: INTERNAL MEDICINE CLINIC | Facility: CLINIC | Age: 47
End: 2022-02-16
Payer: COMMERCIAL

## 2022-02-16 VITALS
SYSTOLIC BLOOD PRESSURE: 110 MMHG | DIASTOLIC BLOOD PRESSURE: 84 MMHG | BODY MASS INDEX: 35.06 KG/M2 | OXYGEN SATURATION: 95 % | HEART RATE: 74 BPM | WEIGHT: 256 LBS | TEMPERATURE: 98 F | HEIGHT: 71.75 IN

## 2022-02-16 DIAGNOSIS — Z12.11 COLON CANCER SCREENING: ICD-10-CM

## 2022-02-16 DIAGNOSIS — Z00.00 LABORATORY EXAM ORDERED AS PART OF ROUTINE GENERAL MEDICAL EXAMINATION: ICD-10-CM

## 2022-02-16 DIAGNOSIS — Z00.00 WELLNESS EXAMINATION: Primary | ICD-10-CM

## 2022-02-16 PROCEDURE — 99396 PREV VISIT EST AGE 40-64: CPT | Performed by: FAMILY MEDICINE

## 2022-02-16 PROCEDURE — 3079F DIAST BP 80-89 MM HG: CPT | Performed by: FAMILY MEDICINE

## 2022-02-16 PROCEDURE — 3008F BODY MASS INDEX DOCD: CPT | Performed by: FAMILY MEDICINE

## 2022-02-16 PROCEDURE — 3074F SYST BP LT 130 MM HG: CPT | Performed by: FAMILY MEDICINE

## 2022-02-16 RX ORDER — CLINDAMYCIN HYDROCHLORIDE 150 MG/1
150 CAPSULE ORAL 3 TIMES DAILY
Qty: 30 CAPSULE | Refills: 0 | Status: SHIPPED | OUTPATIENT
Start: 2022-02-16

## 2022-04-11 ENCOUNTER — LAB ENCOUNTER (OUTPATIENT)
Dept: LAB | Age: 47
End: 2022-04-11
Attending: OTOLARYNGOLOGY
Payer: COMMERCIAL

## 2022-04-11 DIAGNOSIS — Z01.812 PRE-PROCEDURE LAB EXAM: ICD-10-CM

## 2022-04-12 LAB — SARS-COV-2 RNA RESP QL NAA+PROBE: NOT DETECTED

## 2022-04-14 ENCOUNTER — HOSPITAL ENCOUNTER (OUTPATIENT)
Dept: GENERAL RADIOLOGY | Age: 47
Discharge: HOME | End: 2022-04-14
Attending: OTOLARYNGOLOGY
Payer: COMMERCIAL

## 2022-04-14 DIAGNOSIS — R13.10 DYSPHAGIA, UNSPECIFIED TYPE: ICD-10-CM

## 2022-04-14 PROCEDURE — 74221 X-RAY XM ESOPHAGUS 2CNTRST: CPT | Performed by: OTOLARYNGOLOGY

## 2022-05-10 RX ORDER — LEVOTHYROXINE SODIUM 200 UG/1
TABLET ORAL
Qty: 90 TABLET | Refills: 0 | Status: SHIPPED | OUTPATIENT
Start: 2022-05-10

## 2022-05-10 RX ORDER — LOSARTAN POTASSIUM AND HYDROCHLOROTHIAZIDE 12.5; 5 MG/1; MG/1
1 TABLET ORAL
Qty: 90 TABLET | Refills: 0 | Status: SHIPPED | OUTPATIENT
Start: 2022-05-10

## 2022-05-10 NOTE — TELEPHONE ENCOUNTER
Requesting: [place] hydrochlorothiazide 50-12.5mg once daily  LOV: 02/16/2022cW/ Dr. Junaid Dewey  RTC: not on file  Last Relevant Labs: 02/10/2022  Filled: 10/28/2021 #90 with 1 refills    No future appointments. Requesting: Euthyrox 200mcg once daily  LOV: 02/16/2022 W/ Dr. Junaid Dewey  RTC: not on file  Last Relevant Labs: 02/10/2022   Filled: 01/26/2022 #90 with 0 refills    No future appointments.

## 2022-06-06 ENCOUNTER — OFFICE VISIT (OUTPATIENT)
Dept: INTERNAL MEDICINE CLINIC | Facility: CLINIC | Age: 47
End: 2022-06-06
Payer: COMMERCIAL

## 2022-06-06 VITALS
HEIGHT: 71.75 IN | DIASTOLIC BLOOD PRESSURE: 86 MMHG | HEART RATE: 87 BPM | WEIGHT: 261.81 LBS | SYSTOLIC BLOOD PRESSURE: 126 MMHG | RESPIRATION RATE: 21 BRPM | BODY MASS INDEX: 35.85 KG/M2 | TEMPERATURE: 99 F | OXYGEN SATURATION: 97 %

## 2022-06-06 DIAGNOSIS — Z01.818 PREOP EXAMINATION: Primary | ICD-10-CM

## 2022-06-06 DIAGNOSIS — I10 ESSENTIAL HYPERTENSION: ICD-10-CM

## 2022-06-06 DIAGNOSIS — J32.0 CHRONIC MAXILLARY SINUSITIS: ICD-10-CM

## 2022-06-06 PROCEDURE — 3008F BODY MASS INDEX DOCD: CPT | Performed by: FAMILY MEDICINE

## 2022-06-06 PROCEDURE — 3079F DIAST BP 80-89 MM HG: CPT | Performed by: FAMILY MEDICINE

## 2022-06-06 PROCEDURE — 3074F SYST BP LT 130 MM HG: CPT | Performed by: FAMILY MEDICINE

## 2022-06-06 PROCEDURE — 93000 ELECTROCARDIOGRAM COMPLETE: CPT | Performed by: FAMILY MEDICINE

## 2022-06-06 PROCEDURE — 99213 OFFICE O/P EST LOW 20 MIN: CPT | Performed by: FAMILY MEDICINE

## 2022-06-07 LAB
BUN: 14 MG/DL (ref 7–25)
CALCIUM: 9.7 MG/DL (ref 8.6–10.3)
CARBON DIOXIDE: 31 MMOL/L (ref 20–32)
CHLORIDE: 102 MMOL/L (ref 98–110)
CREATININE: 1.1 MG/DL (ref 0.6–1.35)
EGFR IF AFRICN AM: 93 ML/MIN/1.73M2
EGFR IF NONAFRICN AM: 80 ML/MIN/1.73M2
GLUCOSE: 97 MG/DL (ref 65–99)
POTASSIUM: 3.9 MMOL/L (ref 3.5–5.3)
SODIUM: 141 MMOL/L (ref 135–146)

## 2022-06-09 ENCOUNTER — TELEPHONE (OUTPATIENT)
Dept: INTERNAL MEDICINE CLINIC | Facility: CLINIC | Age: 47
End: 2022-06-09

## 2022-06-09 NOTE — TELEPHONE ENCOUNTER
Patients pre-op faxed to [address] [place] [address] office with confirmation and placed in blue accordion folder in Pod 3

## 2022-08-03 DIAGNOSIS — I10 ESSENTIAL HYPERTENSION: ICD-10-CM

## 2022-08-03 DIAGNOSIS — E03.9 HYPOTHYROIDISM, UNSPECIFIED TYPE: ICD-10-CM

## 2022-08-03 RX ORDER — LEVOTHYROXINE SODIUM 200 UG/1
TABLET ORAL
Qty: 90 TABLET | Refills: 1 | Status: SHIPPED | OUTPATIENT
Start: 2022-08-03

## 2022-08-03 RX ORDER — LOSARTAN POTASSIUM AND HYDROCHLOROTHIAZIDE 12.5; 5 MG/1; MG/1
1 TABLET ORAL
Qty: 90 TABLET | Refills: 1 | Status: SHIPPED | OUTPATIENT
Start: 2022-08-03

## 2022-08-03 NOTE — TELEPHONE ENCOUNTER
Euthyrox 200 mcg  Filled 5-10-22  Qty 90  0 refills  No upcoming appt. LOV 6-6-22  Labs: 2-10-22: Thyroid and FREE T4    Losartan-hydrochlorothiazide 50-12.5 mg  Filled 5-10-22  Qty 90  0 refills  No upcoming appt.   LOV 6-6-22

## 2022-09-10 ENCOUNTER — LAB ENCOUNTER (OUTPATIENT)
Dept: LAB | Age: 47
End: 2022-09-10
Attending: INTERNAL MEDICINE
Payer: COMMERCIAL

## 2022-09-10 DIAGNOSIS — Z01.818 PRE-OP TESTING: ICD-10-CM

## 2022-09-11 LAB — SARS-COV-2 RNA RESP QL NAA+PROBE: NOT DETECTED

## 2022-09-13 PROBLEM — R12 CHRONIC HEARTBURN: Status: ACTIVE | Noted: 2022-09-13

## 2022-09-13 PROBLEM — R13.10 DYSPHAGIA, UNSPECIFIED: Status: ACTIVE | Noted: 2022-09-13

## 2022-09-13 PROBLEM — D12.3 BENIGN NEOPLASM OF TRANSVERSE COLON: Status: ACTIVE | Noted: 2022-09-13

## 2022-09-13 PROBLEM — R74.01 ELEVATED ALT MEASUREMENT: Status: ACTIVE | Noted: 2022-09-13

## 2022-09-13 PROBLEM — Z12.11 SPECIAL SCREENING FOR MALIGNANT NEOPLASMS, COLON: Status: ACTIVE | Noted: 2022-09-13

## 2022-09-13 PROBLEM — K29.30 CHRONIC SUPERFICIAL GASTRITIS WITHOUT BLEEDING: Status: ACTIVE | Noted: 2022-09-13

## 2022-09-13 PROBLEM — K44.9 DIAPHRAGMATIC HERNIA WITHOUT OBSTRUCTION OR GANGRENE: Status: ACTIVE | Noted: 2022-09-13

## 2022-09-13 PROBLEM — K21.9 GASTROESOPHAGEAL REFLUX DISEASE WITHOUT ESOPHAGITIS: Status: ACTIVE | Noted: 2022-09-13

## 2022-09-27 ENCOUNTER — PATIENT MESSAGE (OUTPATIENT)
Dept: INTERNAL MEDICINE CLINIC | Facility: CLINIC | Age: 47
End: 2022-09-27

## 2022-09-27 NOTE — TELEPHONE ENCOUNTER
From: Aries Armendariz  Sent: 9/27/2022 11:13 AM CDT  To: [facility] Clinical Staff  Subject: Pre-Refill Question    Perfect thx. That should work out with 1 more round of refills. Hate doing the refill and he changes it a week later.  Adama

## 2022-09-27 NOTE — TELEPHONE ENCOUNTER
From: Matt Nunez  To: Alfonso Flores MD  Sent: 9/27/2022 10:04 AM CDT  Subject: Pre-Refill Question    Going to be putting in for refills soon. Before I do that checking if I'm due for any bloodwork or a visit. I always seem to find that out right after doing a refill, being proactive this time.

## 2022-11-08 DIAGNOSIS — I10 ESSENTIAL HYPERTENSION: ICD-10-CM

## 2022-11-08 DIAGNOSIS — E03.9 HYPOTHYROIDISM, UNSPECIFIED TYPE: ICD-10-CM

## 2022-11-09 RX ORDER — LEVOTHYROXINE SODIUM 0.2 MG/1
TABLET ORAL
Qty: 90 TABLET | Refills: 0 | Status: SHIPPED | OUTPATIENT
Start: 2022-11-09

## 2022-11-09 RX ORDER — LOSARTAN POTASSIUM AND HYDROCHLOROTHIAZIDE 12.5; 5 MG/1; MG/1
1 TABLET ORAL
Qty: 90 TABLET | Refills: 0 | Status: SHIPPED | OUTPATIENT
Start: 2022-11-09

## 2022-12-08 ENCOUNTER — OFFICE VISIT (OUTPATIENT)
Dept: INTERNAL MEDICINE CLINIC | Facility: CLINIC | Age: 47
End: 2022-12-08
Payer: COMMERCIAL

## 2022-12-08 VITALS
BODY MASS INDEX: 35.38 KG/M2 | WEIGHT: 261.19 LBS | HEIGHT: 72 IN | SYSTOLIC BLOOD PRESSURE: 118 MMHG | OXYGEN SATURATION: 96 % | TEMPERATURE: 99 F | DIASTOLIC BLOOD PRESSURE: 82 MMHG | HEART RATE: 118 BPM

## 2022-12-08 DIAGNOSIS — E03.9 HYPOTHYROIDISM, UNSPECIFIED TYPE: ICD-10-CM

## 2022-12-08 DIAGNOSIS — I10 ESSENTIAL HYPERTENSION: Primary | ICD-10-CM

## 2022-12-08 DIAGNOSIS — E66.9 OBESITY (BMI 30-39.9): ICD-10-CM

## 2022-12-08 DIAGNOSIS — R06.83 SNORES: ICD-10-CM

## 2022-12-08 PROCEDURE — 99214 OFFICE O/P EST MOD 30 MIN: CPT | Performed by: FAMILY MEDICINE

## 2022-12-08 PROCEDURE — 3074F SYST BP LT 130 MM HG: CPT | Performed by: FAMILY MEDICINE

## 2022-12-08 PROCEDURE — 3008F BODY MASS INDEX DOCD: CPT | Performed by: FAMILY MEDICINE

## 2022-12-08 PROCEDURE — 3079F DIAST BP 80-89 MM HG: CPT | Performed by: FAMILY MEDICINE

## 2022-12-08 RX ORDER — OMEPRAZOLE 40 MG/1
CAPSULE, DELAYED RELEASE ORAL
COMMUNITY

## 2022-12-19 RX ORDER — CALCIUM POLYCARBOPHIL 625 MG
1 TABLET ORAL DAILY
COMMUNITY
End: 2023-01-05

## 2023-01-05 ENCOUNTER — OFFICE VISIT (OUTPATIENT)
Dept: INTERNAL MEDICINE CLINIC | Facility: CLINIC | Age: 48
End: 2023-01-05
Payer: COMMERCIAL

## 2023-01-05 VITALS
TEMPERATURE: 98 F | BODY MASS INDEX: 35.35 KG/M2 | HEART RATE: 96 BPM | HEIGHT: 72 IN | OXYGEN SATURATION: 98 % | SYSTOLIC BLOOD PRESSURE: 122 MMHG | DIASTOLIC BLOOD PRESSURE: 82 MMHG | WEIGHT: 261 LBS

## 2023-01-05 DIAGNOSIS — J01.10 ACUTE NON-RECURRENT FRONTAL SINUSITIS: Primary | ICD-10-CM

## 2023-01-05 PROCEDURE — 99213 OFFICE O/P EST LOW 20 MIN: CPT | Performed by: FAMILY MEDICINE

## 2023-01-05 PROCEDURE — 3079F DIAST BP 80-89 MM HG: CPT | Performed by: FAMILY MEDICINE

## 2023-01-05 PROCEDURE — 3074F SYST BP LT 130 MM HG: CPT | Performed by: FAMILY MEDICINE

## 2023-01-05 PROCEDURE — 3008F BODY MASS INDEX DOCD: CPT | Performed by: FAMILY MEDICINE

## 2023-01-05 RX ORDER — CLINDAMYCIN HYDROCHLORIDE 150 MG/1
150 CAPSULE ORAL 3 TIMES DAILY
Qty: 30 CAPSULE | Refills: 0 | Status: SHIPPED | OUTPATIENT
Start: 2023-01-05

## 2023-01-18 ENCOUNTER — EKG ENCOUNTER (OUTPATIENT)
Dept: LAB | Age: 48
End: 2023-01-18
Attending: INTERNAL MEDICINE
Payer: COMMERCIAL

## 2023-01-18 DIAGNOSIS — R12 CHRONIC HEARTBURN: ICD-10-CM

## 2023-01-18 DIAGNOSIS — R74.01 ELEVATED ALT MEASUREMENT: ICD-10-CM

## 2023-01-18 LAB
ATRIAL RATE: 99 BPM
P AXIS: 65 DEGREES
P-R INTERVAL: 134 MS
Q-T INTERVAL: 356 MS
QRS DURATION: 106 MS
QTC CALCULATION (BEZET): 456 MS
R AXIS: 33 DEGREES
T AXIS: 54 DEGREES
VENTRICULAR RATE: 99 BPM

## 2023-01-18 PROCEDURE — 93005 ELECTROCARDIOGRAM TRACING: CPT

## 2023-01-18 PROCEDURE — 93010 ELECTROCARDIOGRAM REPORT: CPT | Performed by: INTERNAL MEDICINE

## 2023-02-04 ENCOUNTER — LAB ENCOUNTER (OUTPATIENT)
Dept: LAB | Age: 48
End: 2023-02-04
Attending: INTERNAL MEDICINE
Payer: COMMERCIAL

## 2023-02-04 DIAGNOSIS — Z01.812 ENCOUNTER FOR PREPROCEDURE SCREENING LABORATORY TESTING FOR COVID-19: ICD-10-CM

## 2023-02-04 DIAGNOSIS — Z20.822 ENCOUNTER FOR PREPROCEDURE SCREENING LABORATORY TESTING FOR COVID-19: ICD-10-CM

## 2023-02-05 LAB — SARS-COV-2 RNA RESP QL NAA+PROBE: NOT DETECTED

## 2023-02-07 ENCOUNTER — HOSPITAL ENCOUNTER (OUTPATIENT)
Facility: HOSPITAL | Age: 48
Setting detail: HOSPITAL OUTPATIENT SURGERY
Discharge: HOME | End: 2023-02-07
Attending: INTERNAL MEDICINE | Admitting: INTERNAL MEDICINE
Payer: COMMERCIAL

## 2023-02-07 ENCOUNTER — ANESTHESIA EVENT (OUTPATIENT)
Dept: ENDOSCOPY | Facility: HOSPITAL | Age: 48
End: 2023-02-07
Payer: COMMERCIAL

## 2023-02-07 ENCOUNTER — ANESTHESIA (OUTPATIENT)
Dept: ENDOSCOPY | Facility: HOSPITAL | Age: 48
End: 2023-02-07
Payer: COMMERCIAL

## 2023-02-07 VITALS
WEIGHT: 255 LBS | SYSTOLIC BLOOD PRESSURE: 134 MMHG | RESPIRATION RATE: 20 BRPM | DIASTOLIC BLOOD PRESSURE: 87 MMHG | TEMPERATURE: 97 F | BODY MASS INDEX: 34.54 KG/M2 | HEART RATE: 79 BPM | HEIGHT: 72 IN | OXYGEN SATURATION: 99 %

## 2023-02-07 DIAGNOSIS — R74.01 ELEVATED ALT MEASUREMENT: ICD-10-CM

## 2023-02-07 DIAGNOSIS — R74.8 ELEVATED LIVER ENZYMES: ICD-10-CM

## 2023-02-07 DIAGNOSIS — Z01.812 ENCOUNTER FOR PREPROCEDURE SCREENING LABORATORY TESTING FOR COVID-19: Primary | ICD-10-CM

## 2023-02-07 DIAGNOSIS — R12 CHRONIC HEARTBURN: ICD-10-CM

## 2023-02-07 DIAGNOSIS — Z20.822 ENCOUNTER FOR PREPROCEDURE SCREENING LABORATORY TESTING FOR COVID-19: Primary | ICD-10-CM

## 2023-02-07 PROCEDURE — 88305 TISSUE EXAM BY PATHOLOGIST: CPT | Performed by: INTERNAL MEDICINE

## 2023-02-07 PROCEDURE — 0DB78ZX EXCISION OF STOMACH, PYLORUS, VIA NATURAL OR ARTIFICIAL OPENING ENDOSCOPIC, DIAGNOSTIC: ICD-10-PCS | Performed by: INTERNAL MEDICINE

## 2023-02-07 PROCEDURE — 0DB98ZX EXCISION OF DUODENUM, VIA NATURAL OR ARTIFICIAL OPENING ENDOSCOPIC, DIAGNOSTIC: ICD-10-PCS | Performed by: INTERNAL MEDICINE

## 2023-02-07 PROCEDURE — 0DB58ZX EXCISION OF ESOPHAGUS, VIA NATURAL OR ARTIFICIAL OPENING ENDOSCOPIC, DIAGNOSTIC: ICD-10-PCS | Performed by: INTERNAL MEDICINE

## 2023-02-07 RX ORDER — PSYLLIUM HUSK (WITH SUGAR) 3.4 G/7 G
POWDER (GRAM) ORAL
COMMUNITY

## 2023-02-07 RX ORDER — LIDOCAINE HYDROCHLORIDE 10 MG/ML
INJECTION, SOLUTION EPIDURAL; INFILTRATION; INTRACAUDAL; PERINEURAL AS NEEDED
Status: DISCONTINUED | OUTPATIENT
Start: 2023-02-07 | End: 2023-02-07 | Stop reason: SURG

## 2023-02-07 RX ORDER — HYDROMORPHONE HYDROCHLORIDE 1 MG/ML
0.2 INJECTION, SOLUTION INTRAMUSCULAR; INTRAVENOUS; SUBCUTANEOUS EVERY 5 MIN PRN
Status: DISCONTINUED | OUTPATIENT
Start: 2023-02-07 | End: 2023-02-07

## 2023-02-07 RX ORDER — HYDROMORPHONE HYDROCHLORIDE 1 MG/ML
0.4 INJECTION, SOLUTION INTRAMUSCULAR; INTRAVENOUS; SUBCUTANEOUS EVERY 5 MIN PRN
Status: DISCONTINUED | OUTPATIENT
Start: 2023-02-07 | End: 2023-02-07

## 2023-02-07 RX ORDER — NALOXONE HYDROCHLORIDE 0.4 MG/ML
80 INJECTION, SOLUTION INTRAMUSCULAR; INTRAVENOUS; SUBCUTANEOUS AS NEEDED
Status: DISCONTINUED | OUTPATIENT
Start: 2023-02-07 | End: 2023-02-07

## 2023-02-07 RX ORDER — ACETAMINOPHEN 325 MG/1
325 TABLET ORAL EVERY 6 HOURS PRN
COMMUNITY

## 2023-02-07 RX ORDER — HYDROMORPHONE HYDROCHLORIDE 1 MG/ML
0.6 INJECTION, SOLUTION INTRAMUSCULAR; INTRAVENOUS; SUBCUTANEOUS EVERY 5 MIN PRN
Status: DISCONTINUED | OUTPATIENT
Start: 2023-02-07 | End: 2023-02-07

## 2023-02-07 RX ORDER — SODIUM CHLORIDE, SODIUM LACTATE, POTASSIUM CHLORIDE, CALCIUM CHLORIDE 600; 310; 30; 20 MG/100ML; MG/100ML; MG/100ML; MG/100ML
INJECTION, SOLUTION INTRAVENOUS CONTINUOUS
Status: DISCONTINUED | OUTPATIENT
Start: 2023-02-07 | End: 2023-02-07

## 2023-02-07 RX ADMIN — LIDOCAINE HYDROCHLORIDE 50 MG: 10 INJECTION, SOLUTION EPIDURAL; INFILTRATION; INTRACAUDAL; PERINEURAL at 14:12:00

## 2023-02-07 NOTE — ANESTHESIA POSTPROCEDURE EVALUATION
Satishndybjesús Patient Status:  Hospital Outpatient Surgery   Age/Gender 52year old male MRN [id number]   Location [address] [address] Attending Lenn Duverney, MD   Hosp Day # 0 PCP Adry Burgess MD       Anesthesia Post-op Note    ESOPHAGOGASTRODUODENOSCOPY (EGD) w/ BIOPSIES    Procedure Summary     Date: 02/07/23 Room / Location: [facility] ENDOSCOPY 02 / [facility] ENDOSCOPY    Anesthesia Start: 3093 Anesthesia Stop:     Procedure: ESOPHAGOGASTRODUODENOSCOPY (EGD) w/ BIOPSIES Diagnosis:       Elevated liver enzymes      Chronic heartburn      (Eosinophilic Esophagus)    Surgeons: Lenn Duverney, MD Anesthesiologist: Ben Garcia MD    Anesthesia Type: MAC ASA Status: 2          Anesthesia Type: MAC    Vitals Value Taken Time   BP 12/75 02/07/23 1423   Temp na 02/07/23 1423   Pulse 97 02/07/23 1423   Resp 16 02/07/23 1423   SpO2 96 02/07/23 1423       Patient Location: Endoscopy    Anesthesia Type: MAC    Airway Patency: patent    Postop Pain Control: adequate    Mental Status: mildly sedated but able to meaningfully participate in the post-anesthesia evaluation    Nausea/Vomiting: none    Cardiopulmonary/Hydration status: stable euvolemic    Complications: no apparent anesthesia related complications    Postop vital signs: stable

## 2023-02-13 NOTE — PROGRESS NOTES
Date: 2023    To: Deyanira De Jesus  : 1975     I hope this letter finds you doing well. I am writing to inform you of the following:        Biopsies taken from your esophagus show evidence of a condition called 'eosinophilic esophagitis.'  This is an allergic condition that can cause difficulty swallowing solids and may even result in food becoming stuck in the esophagus. Recommend OV follow up to evaluate current symptoms and need for further therapy, continue Omeprazole for now. Please call the office at [phone] if there are any questions.     Stacy Moser M.D.

## 2023-02-14 ENCOUNTER — OFFICE VISIT (OUTPATIENT)
Dept: SLEEP CENTER | Age: 48
End: 2023-02-14
Attending: FAMILY MEDICINE
Payer: COMMERCIAL

## 2023-02-14 DIAGNOSIS — E66.9 OBESITY (BMI 30-39.9): ICD-10-CM

## 2023-02-14 DIAGNOSIS — R06.83 SNORES: ICD-10-CM

## 2023-02-14 PROCEDURE — 95810 POLYSOM 6/> YRS 4/> PARAM: CPT

## 2023-02-24 ENCOUNTER — OFFICE VISIT (OUTPATIENT)
Dept: INTERNAL MEDICINE CLINIC | Facility: CLINIC | Age: 48
End: 2023-02-24
Payer: COMMERCIAL

## 2023-02-24 VITALS
WEIGHT: 262.38 LBS | HEIGHT: 72 IN | HEART RATE: 115 BPM | SYSTOLIC BLOOD PRESSURE: 118 MMHG | DIASTOLIC BLOOD PRESSURE: 80 MMHG | BODY MASS INDEX: 35.54 KG/M2 | OXYGEN SATURATION: 96 % | TEMPERATURE: 98 F

## 2023-02-24 DIAGNOSIS — E03.9 HYPOTHYROIDISM, UNSPECIFIED TYPE: ICD-10-CM

## 2023-02-24 DIAGNOSIS — Z00.00 WELLNESS EXAMINATION: Primary | ICD-10-CM

## 2023-02-24 DIAGNOSIS — G47.33 OSA (OBSTRUCTIVE SLEEP APNEA): ICD-10-CM

## 2023-02-24 DIAGNOSIS — I10 ESSENTIAL HYPERTENSION: ICD-10-CM

## 2023-02-24 DIAGNOSIS — Z01.89 LABORATORY EXAMINATION: ICD-10-CM

## 2023-02-24 DIAGNOSIS — R74.8 ELEVATED LIVER ENZYMES: ICD-10-CM

## 2023-02-24 PROBLEM — R12 CHRONIC HEARTBURN: Status: RESOLVED | Noted: 2022-09-13 | Resolved: 2023-02-24

## 2023-02-24 PROBLEM — Z12.11 SPECIAL SCREENING FOR MALIGNANT NEOPLASMS, COLON: Status: RESOLVED | Noted: 2022-09-13 | Resolved: 2023-02-24

## 2023-02-24 PROBLEM — D12.3 BENIGN NEOPLASM OF TRANSVERSE COLON: Status: RESOLVED | Noted: 2022-09-13 | Resolved: 2023-02-24

## 2023-02-24 PROBLEM — R74.01 ELEVATED ALT MEASUREMENT: Status: RESOLVED | Noted: 2022-09-13 | Resolved: 2023-02-24

## 2023-02-24 PROCEDURE — 3079F DIAST BP 80-89 MM HG: CPT | Performed by: FAMILY MEDICINE

## 2023-02-24 PROCEDURE — 3074F SYST BP LT 130 MM HG: CPT | Performed by: FAMILY MEDICINE

## 2023-02-24 PROCEDURE — 90746 HEPB VACCINE 3 DOSE ADULT IM: CPT | Performed by: FAMILY MEDICINE

## 2023-02-24 PROCEDURE — 99396 PREV VISIT EST AGE 40-64: CPT | Performed by: FAMILY MEDICINE

## 2023-02-24 PROCEDURE — 3008F BODY MASS INDEX DOCD: CPT | Performed by: FAMILY MEDICINE

## 2023-02-24 PROCEDURE — 90471 IMMUNIZATION ADMIN: CPT | Performed by: FAMILY MEDICINE

## 2023-02-24 RX ORDER — LOSARTAN POTASSIUM AND HYDROCHLOROTHIAZIDE 12.5; 5 MG/1; MG/1
1 TABLET ORAL
Qty: 90 TABLET | Refills: 0 | Status: SHIPPED | OUTPATIENT
Start: 2023-02-24

## 2023-02-28 DIAGNOSIS — I10 ESSENTIAL HYPERTENSION: ICD-10-CM

## 2023-02-28 LAB
ABSOLUTE BASOPHILS: 92 CELLS/UL (ref 0–200)
ABSOLUTE EOSINOPHILS: 420 CELLS/UL (ref 15–500)
ABSOLUTE LYMPHOCYTES: 2176 CELLS/UL (ref 850–3900)
ABSOLUTE MONOCYTES: 638 CELLS/UL (ref 200–950)
ABSOLUTE NEUTROPHILS: 5074 CELLS/UL (ref 1500–7800)
ALBUMIN/GLOBULIN RATIO: 1.7 (CALC) (ref 1–2.5)
ALBUMIN: 4.6 G/DL (ref 3.6–5.1)
ALKALINE PHOSPHATASE: 124 U/L (ref 36–130)
ALT: 96 U/L (ref 9–46)
APPEARANCE: CLEAR
AST: 51 U/L (ref 10–40)
BASOPHILS: 1.1 %
BILIRUBIN, TOTAL: 0.9 MG/DL (ref 0.2–1.2)
BILIRUBIN: NEGATIVE
BUN: 16 MG/DL (ref 7–25)
CALCIUM: 9.6 MG/DL (ref 8.6–10.3)
CARBON DIOXIDE: 30 MMOL/L (ref 20–32)
CHLORIDE: 101 MMOL/L (ref 98–110)
CHOL/HDLC RATIO: 5.3 (CALC)
CHOLESTEROL, TOTAL: 186 MG/DL
COLOR: YELLOW
CREATININE: 1.02 MG/DL (ref 0.6–1.29)
EGFR: 91 ML/MIN/1.73M2
EOSINOPHILS: 5 %
GLOBULIN: 2.7 G/DL (CALC) (ref 1.9–3.7)
GLUCOSE: 113 MG/DL (ref 65–99)
GLUCOSE: NEGATIVE
HDL CHOLESTEROL: 35 MG/DL
HEMATOCRIT: 49.9 % (ref 38.5–50)
HEMOGLOBIN A1C: 5.4 % OF TOTAL HGB
HEMOGLOBIN: 16.7 G/DL (ref 13.2–17.1)
KETONES: NEGATIVE
LDL-CHOLESTEROL: 119 MG/DL (CALC)
LEUKOCYTE ESTERASE: NEGATIVE
LYMPHOCYTES: 25.9 %
MCH: 29.3 PG (ref 27–33)
MCHC: 33.5 G/DL (ref 32–36)
MCV: 87.7 FL (ref 80–100)
MONOCYTES: 7.6 %
MPV: 10.8 FL (ref 7.5–12.5)
NEUTROPHILS: 60.4 %
NITRITE: NEGATIVE
NON-HDL CHOLESTEROL: 151 MG/DL (CALC)
OCCULT BLOOD: NEGATIVE
PH: 6.5 (ref 5–8)
PLATELET COUNT: 210 THOUSAND/UL (ref 140–400)
POTASSIUM: 4.1 MMOL/L (ref 3.5–5.3)
PROTEIN, TOTAL: 7.3 G/DL (ref 6.1–8.1)
PROTEIN: NEGATIVE
PSA, TOTAL: 0.42 NG/ML
RDW: 14 % (ref 11–15)
RED BLOOD CELL COUNT: 5.69 MILLION/UL (ref 4.2–5.8)
SODIUM: 140 MMOL/L (ref 135–146)
SPECIFIC GRAVITY: 1.02 (ref 1–1.03)
T4, FREE: 1.6 NG/DL (ref 0.8–1.8)
TRIGLYCERIDES: 197 MG/DL
TSH: 4.36 MIU/L (ref 0.4–4.5)
WHITE BLOOD CELL COUNT: 8.4 THOUSAND/UL (ref 3.8–10.8)

## 2023-02-28 RX ORDER — LOSARTAN POTASSIUM AND HYDROCHLOROTHIAZIDE 12.5; 5 MG/1; MG/1
1 TABLET ORAL
Qty: 90 TABLET | Refills: 0 | Status: CANCELLED | OUTPATIENT
Start: 2023-02-28

## 2023-02-28 NOTE — TELEPHONE ENCOUNTER
Losartan was already sent to pharmacy on [address]. Pt requested refill on it.    [facility] sent to pt

## 2023-03-01 DIAGNOSIS — E03.9 HYPOTHYROIDISM, UNSPECIFIED TYPE: ICD-10-CM

## 2023-03-01 RX ORDER — LEVOTHYROXINE SODIUM 0.2 MG/1
200 TABLET ORAL
Qty: 90 TABLET | Refills: 0 | Status: SHIPPED | OUTPATIENT
Start: 2023-03-01

## 2023-03-01 NOTE — TELEPHONE ENCOUNTER
Levothyroxine 200 mcg  Filled 11-9-22  Qty 90  0 refills  No upcoming appt.   LOV 2-24-23 TO   Labs 2-27-23 TSH, Free T4

## 2023-04-08 ENCOUNTER — LAB ENCOUNTER (OUTPATIENT)
Dept: LAB | Age: 48
End: 2023-04-08
Attending: FAMILY MEDICINE
Payer: COMMERCIAL

## 2023-04-08 DIAGNOSIS — Z01.818 PREOPERATIVE EXAMINATION, UNSPECIFIED: ICD-10-CM

## 2023-04-08 DIAGNOSIS — Z11.59 SCREENING FOR VIRAL DISEASE: ICD-10-CM

## 2023-04-09 LAB — SARS-COV-2 RNA RESP QL NAA+PROBE: NOT DETECTED

## 2023-04-11 ENCOUNTER — OFFICE VISIT (OUTPATIENT)
Dept: SLEEP CENTER | Age: 48
End: 2023-04-11
Attending: INTERNAL MEDICINE
Payer: COMMERCIAL

## 2023-04-11 DIAGNOSIS — E66.9 OBESITY (BMI 30-39.9): ICD-10-CM

## 2023-04-11 DIAGNOSIS — R06.83 SNORES: ICD-10-CM

## 2023-04-11 PROCEDURE — 95811 POLYSOM 6/>YRS CPAP 4/> PARM: CPT

## 2023-05-02 ENCOUNTER — HOSPITAL ENCOUNTER (OUTPATIENT)
Dept: ULTRASOUND IMAGING | Age: 48
Discharge: HOME | End: 2023-05-02
Attending: FAMILY MEDICINE
Payer: COMMERCIAL

## 2023-05-02 DIAGNOSIS — R74.8 ELEVATED LIVER ENZYMES: ICD-10-CM

## 2023-05-02 PROCEDURE — 76981 USE PARENCHYMA: CPT | Performed by: FAMILY MEDICINE

## 2023-05-02 PROCEDURE — 76705 ECHO EXAM OF ABDOMEN: CPT | Performed by: FAMILY MEDICINE

## 2023-05-04 ENCOUNTER — OFFICE VISIT (OUTPATIENT)
Dept: INTERNAL MEDICINE CLINIC | Facility: CLINIC | Age: 48
End: 2023-05-04
Payer: COMMERCIAL

## 2023-05-04 VITALS
TEMPERATURE: 98 F | HEART RATE: 78 BPM | DIASTOLIC BLOOD PRESSURE: 80 MMHG | HEIGHT: 72 IN | BODY MASS INDEX: 35.92 KG/M2 | OXYGEN SATURATION: 98 % | WEIGHT: 265.19 LBS | SYSTOLIC BLOOD PRESSURE: 126 MMHG

## 2023-05-04 DIAGNOSIS — Z71.85 VACCINE COUNSELING: ICD-10-CM

## 2023-05-04 DIAGNOSIS — I10 ESSENTIAL HYPERTENSION: Primary | ICD-10-CM

## 2023-05-04 DIAGNOSIS — E03.9 HYPOTHYROIDISM, UNSPECIFIED TYPE: ICD-10-CM

## 2023-05-04 PROCEDURE — 3008F BODY MASS INDEX DOCD: CPT | Performed by: FAMILY MEDICINE

## 2023-05-04 PROCEDURE — 3074F SYST BP LT 130 MM HG: CPT | Performed by: FAMILY MEDICINE

## 2023-05-04 PROCEDURE — 3079F DIAST BP 80-89 MM HG: CPT | Performed by: FAMILY MEDICINE

## 2023-05-04 PROCEDURE — 90471 IMMUNIZATION ADMIN: CPT | Performed by: FAMILY MEDICINE

## 2023-05-04 PROCEDURE — 90746 HEPB VACCINE 3 DOSE ADULT IM: CPT | Performed by: FAMILY MEDICINE

## 2023-05-04 PROCEDURE — 99213 OFFICE O/P EST LOW 20 MIN: CPT | Performed by: FAMILY MEDICINE

## 2023-05-04 PROCEDURE — 90472 IMMUNIZATION ADMIN EACH ADD: CPT | Performed by: FAMILY MEDICINE

## 2023-05-04 PROCEDURE — 90715 TDAP VACCINE 7 YRS/> IM: CPT | Performed by: FAMILY MEDICINE

## 2023-05-04 RX ORDER — LOSARTAN POTASSIUM AND HYDROCHLOROTHIAZIDE 12.5; 5 MG/1; MG/1
1 TABLET ORAL
Qty: 90 TABLET | Refills: 0 | Status: SHIPPED | OUTPATIENT
Start: 2023-05-04

## 2023-05-26 ENCOUNTER — TELEPHONE (OUTPATIENT)
Dept: INTERNAL MEDICINE CLINIC | Facility: CLINIC | Age: 48
End: 2023-05-26

## 2023-06-08 DIAGNOSIS — E03.9 HYPOTHYROIDISM, UNSPECIFIED TYPE: ICD-10-CM

## 2023-06-09 RX ORDER — LEVOTHYROXINE SODIUM 0.2 MG/1
TABLET ORAL
Qty: 90 TABLET | Refills: 0 | Status: SHIPPED | OUTPATIENT
Start: 2023-06-09

## 2023-09-14 ENCOUNTER — TELEPHONE (OUTPATIENT)
Dept: INTERNAL MEDICINE CLINIC | Facility: CLINIC | Age: 48
End: 2023-09-14

## 2023-09-15 ENCOUNTER — NURSE ONLY (OUTPATIENT)
Dept: INTERNAL MEDICINE CLINIC | Facility: CLINIC | Age: 48
End: 2023-09-15
Payer: COMMERCIAL

## 2023-09-18 ENCOUNTER — ANESTHESIA EVENT (OUTPATIENT)
Dept: ENDOSCOPY | Facility: HOSPITAL | Age: 48
End: 2023-09-18
Payer: COMMERCIAL

## 2023-09-19 ENCOUNTER — ANESTHESIA (OUTPATIENT)
Dept: ENDOSCOPY | Facility: HOSPITAL | Age: 48
End: 2023-09-19
Payer: COMMERCIAL

## 2023-09-19 ENCOUNTER — HOSPITAL ENCOUNTER (OUTPATIENT)
Facility: HOSPITAL | Age: 48
Setting detail: HOSPITAL OUTPATIENT SURGERY
Discharge: HOME | End: 2023-09-19
Attending: INTERNAL MEDICINE | Admitting: INTERNAL MEDICINE
Payer: COMMERCIAL

## 2023-09-19 VITALS
SYSTOLIC BLOOD PRESSURE: 121 MMHG | DIASTOLIC BLOOD PRESSURE: 57 MMHG | OXYGEN SATURATION: 98 % | HEIGHT: 72 IN | RESPIRATION RATE: 20 BRPM | BODY MASS INDEX: 35.21 KG/M2 | TEMPERATURE: 97 F | WEIGHT: 260 LBS | HEART RATE: 86 BPM

## 2023-09-19 DIAGNOSIS — K20.0 EOSINOPHILIC ESOPHAGITIS: ICD-10-CM

## 2023-09-19 PROCEDURE — 0DB58ZX EXCISION OF ESOPHAGUS, VIA NATURAL OR ARTIFICIAL OPENING ENDOSCOPIC, DIAGNOSTIC: ICD-10-PCS | Performed by: INTERNAL MEDICINE

## 2023-09-19 PROCEDURE — 88305 TISSUE EXAM BY PATHOLOGIST: CPT | Performed by: INTERNAL MEDICINE

## 2023-09-19 RX ORDER — SODIUM CHLORIDE, SODIUM LACTATE, POTASSIUM CHLORIDE, CALCIUM CHLORIDE 600; 310; 30; 20 MG/100ML; MG/100ML; MG/100ML; MG/100ML
INJECTION, SOLUTION INTRAVENOUS CONTINUOUS
Status: DISCONTINUED | OUTPATIENT
Start: 2023-09-19 | End: 2023-09-19

## 2023-09-19 RX ORDER — LIDOCAINE HYDROCHLORIDE 10 MG/ML
INJECTION, SOLUTION EPIDURAL; INFILTRATION; INTRACAUDAL; PERINEURAL AS NEEDED
Status: DISCONTINUED | OUTPATIENT
Start: 2023-09-19 | End: 2023-09-19 | Stop reason: SURG

## 2023-09-19 RX ADMIN — LIDOCAINE HYDROCHLORIDE 100 MG: 10 INJECTION, SOLUTION EPIDURAL; INFILTRATION; INTRACAUDAL; PERINEURAL at 09:49:00

## 2023-09-19 NOTE — DISCHARGE INSTRUCTIONS
Home Care Instructions for Gastroscopy with Sedation    Diet:  - Resume your regular diet as tolerated unless otherwise instructed. - Start with light meals to minimize bloating.  - Do not drink alcohol today. Medication:  - If you have questions about resuming your normal medications, please contact your Primary Care Physician. Activities:  - Take it easy today. Do not return to work today. - Do not drive today. - Do not operate any machinery today (including kitchen equipment). Gastroscopy:  - You may have a sore throat for 2-3 days following the exam. This is normal. Gargling with warm salt water (1/2 tsp salt to 1 glass warm water) or using throat lozenges will help. - If you experience any sharp pain in your neck, abdomen or chest, vomiting of blood, oral temperature over 100 degrees Fahrenheit, light-headedness or dizziness, or any other problems, contact your doctor. **If unable to reach your doctor, please go to the [facility] Emergency Room**    - Your referring physician will receive a full report of your examination.  - If you do not hear from your doctor's office within two weeks of your biopsy, please call them for your results. You may be able to see your laboratory results in [facility] between 4 and 7 business days. In some cases, your physician may not have viewed the results before they are released to [address] If you have questions regarding your results contact the physician who ordered the test/exam by phone or via [address] by choosing \"Ask a Medical Question. \"

## 2023-09-19 NOTE — H&P
[address] Patient Status:  Hospital Outpatient Surgery    1975 MRN [id number]   Location [address] [address] Attending Natividad Serrato MD   Date 2023 PCP Zandra Siemens, MD     CC: Eosinophilic esophagitis     History of Present Illness:  Lacey Manning is a a(n) 50year old male. Eosinophilic esophagitis     History:  Past Medical History:   Diagnosis Date    Belching     Black stools 2022    Post Sinus surgery    Blood in the stool 2022    Post sinus surgery    COVID-19 2022    cold symptoms. Diarrhea, unspecified     When sick    Disorder of thyroid     Esophageal reflux     Fatigue     When sick    Fever     When sick    Flatulence/gas pain/belching     Headache disorder     Heartburn Years    High blood pressure     Hx of motion sickness     as a child    Loss of appetite     When sick    Nausea     When sick    Night sweats     When sick    Problems with swallowing 2022    Sleep apnea     cpap    Sleep disturbance     When sick    Thyroid disease     Vomiting     When sick     Past Surgical History:   Procedure Laterality Date    COLONOSCOPY  Oct 2022    SINUS SURGERY         Family History   Problem Relation Age of Onset    Cancer Other         Colon, Fam hx, Grandfather    Diabetes Other         Grandmother    Hypertension Father     Thyroid Disorder Mother     Cancer Maternal Grandmother         uterine    Diabetes Maternal Grandfather       reports that he has never smoked. He has never used smokeless tobacco. He reports that he does not drink alcohol and does not use drugs. Allergies:    Amoxicillin             OTHER (SEE COMMENTS)    Comment:Pt states it has not worked in the past  Sulfa Drugs Cross R*    RASH    Medications:    Current Facility-Administered Medications:     lactated ringers infusion, , Intravenous, Continuous    Physical Exam:    Height 6' (1.829 m), weight 265 lb (120.2 kg).     General: Appears alert, oriented x3 and in no acute distress. CV: Normal rate   Lungs: Normal effort   Skin: Warm and dry. Laboratory Data:       Imaging:    Assessment/Plan/Procedure:  Patient Active Problem List:     Hypothyroidism     Pure hyperglyceridemia     Essential hypertension     Elevated liver enzymes     Obesity (BMI 30-39. 9)     Dysphagia, unspecified     Chronic superficial gastritis without bleeding     Gastroesophageal reflux disease without esophagitis     Diaphragmatic hernia without obstruction or gangrene      Eosinophilic esophagitis     Plan:  EGD    Tarun Reynoso MD  9/19/2023  9:03 AM

## 2023-09-22 NOTE — PROGRESS NOTES
EGD and biopsy reports reviewed. Please have patient follow-up with Jean Paul Serrano NP in 2 months to plan further management of his eosinophilic esophagitis.   Carlotta Bentley MD

## 2023-09-27 ENCOUNTER — OFFICE VISIT (OUTPATIENT)
Dept: INTERNAL MEDICINE CLINIC | Facility: CLINIC | Age: 48
End: 2023-09-27
Payer: COMMERCIAL

## 2023-09-27 VITALS
BODY MASS INDEX: 35.3 KG/M2 | SYSTOLIC BLOOD PRESSURE: 118 MMHG | TEMPERATURE: 98 F | DIASTOLIC BLOOD PRESSURE: 78 MMHG | HEART RATE: 108 BPM | WEIGHT: 260.63 LBS | HEIGHT: 72 IN | OXYGEN SATURATION: 95 % | RESPIRATION RATE: 20 BRPM

## 2023-09-27 DIAGNOSIS — J01.40 ACUTE NON-RECURRENT PANSINUSITIS: Primary | ICD-10-CM

## 2023-09-27 PROCEDURE — 3074F SYST BP LT 130 MM HG: CPT | Performed by: FAMILY MEDICINE

## 2023-09-27 PROCEDURE — 99213 OFFICE O/P EST LOW 20 MIN: CPT | Performed by: FAMILY MEDICINE

## 2023-09-27 PROCEDURE — 3078F DIAST BP <80 MM HG: CPT | Performed by: FAMILY MEDICINE

## 2023-09-27 PROCEDURE — 3008F BODY MASS INDEX DOCD: CPT | Performed by: FAMILY MEDICINE

## 2023-09-27 RX ORDER — CEFDINIR 300 MG/1
300 CAPSULE ORAL 2 TIMES DAILY
Qty: 20 CAPSULE | Refills: 0 | Status: SHIPPED | OUTPATIENT
Start: 2023-09-27

## 2023-10-30 ENCOUNTER — OFFICE VISIT (OUTPATIENT)
Dept: INTERNAL MEDICINE CLINIC | Facility: CLINIC | Age: 48
End: 2023-10-30

## 2023-10-30 VITALS
TEMPERATURE: 98 F | HEART RATE: 90 BPM | BODY MASS INDEX: 36.35 KG/M2 | DIASTOLIC BLOOD PRESSURE: 80 MMHG | SYSTOLIC BLOOD PRESSURE: 118 MMHG | WEIGHT: 268.38 LBS | HEIGHT: 72 IN | OXYGEN SATURATION: 99 %

## 2023-10-30 DIAGNOSIS — I10 ESSENTIAL HYPERTENSION: ICD-10-CM

## 2023-10-30 DIAGNOSIS — E66.9 OBESITY (BMI 30-39.9): Primary | ICD-10-CM

## 2023-10-30 DIAGNOSIS — E03.9 HYPOTHYROIDISM, UNSPECIFIED TYPE: ICD-10-CM

## 2023-10-30 PROCEDURE — 99213 OFFICE O/P EST LOW 20 MIN: CPT | Performed by: FAMILY MEDICINE

## 2023-10-30 PROCEDURE — 3008F BODY MASS INDEX DOCD: CPT | Performed by: FAMILY MEDICINE

## 2023-10-30 PROCEDURE — 90686 IIV4 VACC NO PRSV 0.5 ML IM: CPT | Performed by: FAMILY MEDICINE

## 2023-10-30 PROCEDURE — 3079F DIAST BP 80-89 MM HG: CPT | Performed by: FAMILY MEDICINE

## 2023-10-30 PROCEDURE — 3074F SYST BP LT 130 MM HG: CPT | Performed by: FAMILY MEDICINE

## 2023-10-30 PROCEDURE — 90471 IMMUNIZATION ADMIN: CPT | Performed by: FAMILY MEDICINE

## 2023-10-30 RX ORDER — LEVOTHYROXINE SODIUM 0.2 MG/1
200 TABLET ORAL
Qty: 90 TABLET | Refills: 1 | Status: SHIPPED | OUTPATIENT
Start: 2023-10-30

## 2023-10-30 RX ORDER — OMEPRAZOLE 40 MG/1
CAPSULE, DELAYED RELEASE ORAL
Qty: 90 CAPSULE | Refills: 1 | Status: CANCELLED | OUTPATIENT
Start: 2023-10-30

## 2023-10-30 RX ORDER — LOSARTAN POTASSIUM AND HYDROCHLOROTHIAZIDE 12.5; 5 MG/1; MG/1
1 TABLET ORAL
Qty: 90 TABLET | Refills: 1 | Status: SHIPPED | OUTPATIENT
Start: 2023-10-30

## 2023-11-02 PROBLEM — R74.01 ALT (SGPT) LEVEL RAISED: Status: ACTIVE | Noted: 2023-04-18

## 2024-02-29 ENCOUNTER — OFFICE VISIT (OUTPATIENT)
Dept: INTERNAL MEDICINE CLINIC | Facility: CLINIC | Age: 49
End: 2024-02-29
Payer: COMMERCIAL

## 2024-02-29 VITALS
HEIGHT: 72 IN | RESPIRATION RATE: 16 BRPM | OXYGEN SATURATION: 96 % | BODY MASS INDEX: 34.95 KG/M2 | HEART RATE: 80 BPM | DIASTOLIC BLOOD PRESSURE: 62 MMHG | TEMPERATURE: 97 F | SYSTOLIC BLOOD PRESSURE: 120 MMHG | WEIGHT: 258 LBS

## 2024-02-29 DIAGNOSIS — E66.9 OBESITY (BMI 30-39.9): ICD-10-CM

## 2024-02-29 DIAGNOSIS — Z00.00 ENCOUNTER FOR WELLNESS EXAMINATION: Primary | ICD-10-CM

## 2024-02-29 PROBLEM — K76.0 FATTY LIVER: Status: ACTIVE | Noted: 2024-02-29

## 2024-02-29 PROCEDURE — 99396 PREV VISIT EST AGE 40-64: CPT | Performed by: FAMILY MEDICINE

## 2024-02-29 NOTE — PROGRESS NOTES
Riky Nixon is a 48 year old male who presents for a complete physical exam.   HPI:       Wt Readings from Last 6 Encounters:   02/29/24 258 lb (117 kg)   02/01/24 271 lb (122.9 kg)   11/02/23 271 lb 6.4 oz (123.1 kg)   10/30/23 268 lb 6.4 oz (121.7 kg)   09/27/23 260 lb 9.6 oz (118.2 kg)   09/19/23 260 lb (117.9 kg)     Body mass index is 34.99 kg/m².     CHOLESTEROL, TOTAL (mg/dL)   Date Value   02/27/2024 155   02/27/2023 186   02/10/2022 191     HDL CHOLESTEROL (mg/dL)   Date Value   02/27/2024 31 (L)   02/27/2023 35 (L)   02/10/2022 38 (L)     LDL-CHOLESTEROL (mg/dL (calc))   Date Value   02/27/2024 96   02/27/2023 119 (H)   02/10/2022 121 (H)     AST (U/L)   Date Value   02/27/2024 39   02/27/2023 51 (H)   12/02/2022 55 (H)     ALT (U/L)   Date Value   02/27/2024 65 (H)   02/27/2023 96 (H)   12/02/2022 98 (H)      Current Outpatient Medications   Medication Sig Dispense Refill    Omeprazole 40 MG Oral Capsule Delayed Release Take 1 capsule (40 mg total) by mouth daily. 30 minutes before breakfast      levothyroxine 200 MCG Oral Tab Take 1 tablet (200 mcg total) by mouth before breakfast. 90 tablet 1    losartan-hydroCHLOROthiazide 50-12.5 MG Oral Tab Take 1 tablet by mouth once daily. 90 tablet 1    [employer] Fiber Gummies 2 g Oral Chew Tab Chew by mouth.      triamcinolone 55 MCG/ACT Nasal Aerosol 1 spray by Nasal route daily as needed.        Past Medical History:   Diagnosis Date    Belching     Black stools 07/2022    Post Sinus surgery    Blood in the stool 07/2022    Post sinus surgery    COVID-19 01/03/2022    cold symptoms.    Diarrhea, unspecified     When sick    Disorder of thyroid     Esophageal reflux     Fatigue     When sick    Fever     When sick    Flatulence/gas pain/belching     Headache disorder     Heartburn Years    High blood pressure     Hx of motion sickness     as a child    Loss of appetite     When sick    Nausea     When sick    Night sweats     When sick    Problems with  swallowing 01/2022    Sleep apnea     cpap    Sleep disturbance     When sick    Thyroid disease     Vomiting     When sick      Past Surgical History:   Procedure Laterality Date    COLONOSCOPY  Oct 2022    SINUS SURGERY          Family History   Problem Relation Age of Onset    Cancer Other         Colon, Fam hx, Grandfather    Diabetes Other         Grandmother    Hypertension Father     Thyroid Disorder Mother     Cancer Maternal Grandmother         uterine    Diabetes Maternal Grandfather       Social History:  Social History     Socioeconomic History    Marital status:    Tobacco Use    Smoking status: Never    Smokeless tobacco: Never   Vaping Use    Vaping Use: Never used   Substance and Sexual Activity    Alcohol use: No    Drug use: No   Other Topics Concern    Caffeine Concern Yes     Comment: 2-3 cans of Diet Coke daily.    Stress Concern Yes    Weight Concern Yes    Special Diet No    Exercise No    Seat Belt Yes      .        REVIEW OF SYSTEMS:   GENERAL: feels well otherwise   lost weight using an trey for calories in the foods he is eating     SKIN: denies rash  HEENT: denies nasal congestion, sinus pain or ST  LUNGS: denies shortness of breath  CARDIOVASCULAR: denies chest pain on exertion  GI: denies abdominal pain    seeing liver specialist     : denies dysuria  NEURO: denies headaches  PSYCHE: denies depression or anxiety  HEMATOLOGIC: denies hx of anemia  ENDOCRINE: +thyroid history  ALL/ASTHMA: denies hx of allergy or asthma    EXAM:   /62 (BP Location: Right arm, Patient Position: Sitting, Cuff Size: adult)   Pulse 80   Temp 97.2 °F (36.2 °C) (Temporal)   Resp 16   Ht 6' (1.829 m)   Wt 258 lb (117 kg)   SpO2 96%   BMI 34.99 kg/m²   Body mass index is 34.99 kg/m².   GENERAL: well developed, well nourished,in no apparent distress  SKIN: no rashes,  HEENT: atraumatic, normocephalic,ears and throat are clear  NECK: supple,no adenopathy  LUNGS: clear to auscultation  CARDIO:  RRR without murmur  GI: good BS's,no masses, HSM or tenderness  : two descended testes,no masses,no hernia,no penile lesions  RECTAL: good rectal tone, prostate shows no masses, stool is OB negative  EXTREMITIES: no edema  NEURO: motor and sensory are grossly intact    ASSESSMENT AND PLAN:   Riky Nixon is a 48 year old male who presents for a complete physical exam.  Health maintenance,    discussed labs   BS and LDL better w the weight loss     CPM   Doing great     f/u 6 mo      The patient indicates understanding of these issues and agrees to the plan.  The patient is asked to return for CPX in 1yr.

## 2024-03-05 ENCOUNTER — ANESTHESIA (OUTPATIENT)
Dept: ENDOSCOPY | Facility: HOSPITAL | Age: 49
End: 2024-03-05
Payer: COMMERCIAL

## 2024-03-05 ENCOUNTER — TELEPHONE (OUTPATIENT)
Dept: INTERNAL MEDICINE CLINIC | Facility: CLINIC | Age: 49
End: 2024-03-05

## 2024-03-05 ENCOUNTER — ANESTHESIA EVENT (OUTPATIENT)
Dept: ENDOSCOPY | Facility: HOSPITAL | Age: 49
End: 2024-03-05
Payer: COMMERCIAL

## 2024-03-05 ENCOUNTER — HOSPITAL ENCOUNTER (OUTPATIENT)
Facility: HOSPITAL | Age: 49
Setting detail: HOSPITAL OUTPATIENT SURGERY
Discharge: HOME | End: 2024-03-05
Attending: INTERNAL MEDICINE | Admitting: INTERNAL MEDICINE
Payer: COMMERCIAL

## 2024-03-05 VITALS
RESPIRATION RATE: 20 BRPM | WEIGHT: 270 LBS | OXYGEN SATURATION: 96 % | HEIGHT: 72 IN | TEMPERATURE: 98 F | SYSTOLIC BLOOD PRESSURE: 108 MMHG | HEART RATE: 80 BPM | DIASTOLIC BLOOD PRESSURE: 60 MMHG | BODY MASS INDEX: 36.57 KG/M2

## 2024-03-05 DIAGNOSIS — K20.0 EOSINOPHILIC ESOPHAGITIS: ICD-10-CM

## 2024-03-05 DIAGNOSIS — K44.9 HIATAL HERNIA: ICD-10-CM

## 2024-03-05 DIAGNOSIS — K21.00 GASTROESOPHAGEAL REFLUX DISEASE WITH ESOPHAGITIS WITHOUT HEMORRHAGE: ICD-10-CM

## 2024-03-05 PROCEDURE — 88312 SPECIAL STAINS GROUP 1: CPT | Performed by: INTERNAL MEDICINE

## 2024-03-05 PROCEDURE — 0DB58ZX EXCISION OF ESOPHAGUS, VIA NATURAL OR ARTIFICIAL OPENING ENDOSCOPIC, DIAGNOSTIC: ICD-10-PCS | Performed by: INTERNAL MEDICINE

## 2024-03-05 PROCEDURE — 88305 TISSUE EXAM BY PATHOLOGIST: CPT | Performed by: INTERNAL MEDICINE

## 2024-03-05 RX ORDER — SODIUM CHLORIDE, SODIUM LACTATE, POTASSIUM CHLORIDE, CALCIUM CHLORIDE 600; 310; 30; 20 MG/100ML; MG/100ML; MG/100ML; MG/100ML
INJECTION, SOLUTION INTRAVENOUS CONTINUOUS
OUTPATIENT
Start: 2024-03-05

## 2024-03-05 RX ORDER — SODIUM CHLORIDE, SODIUM LACTATE, POTASSIUM CHLORIDE, CALCIUM CHLORIDE 600; 310; 30; 20 MG/100ML; MG/100ML; MG/100ML; MG/100ML
INJECTION, SOLUTION INTRAVENOUS CONTINUOUS
Status: DISCONTINUED | OUTPATIENT
Start: 2024-03-05 | End: 2024-03-05

## 2024-03-05 RX ORDER — NALOXONE HYDROCHLORIDE 0.4 MG/ML
0.08 INJECTION, SOLUTION INTRAMUSCULAR; INTRAVENOUS; SUBCUTANEOUS ONCE AS NEEDED
OUTPATIENT
Start: 2024-03-05 | End: 2024-03-05

## 2024-03-05 RX ORDER — LIDOCAINE HYDROCHLORIDE 10 MG/ML
INJECTION, SOLUTION EPIDURAL; INFILTRATION; INTRACAUDAL; PERINEURAL AS NEEDED
Status: DISCONTINUED | OUTPATIENT
Start: 2024-03-05 | End: 2024-03-05 | Stop reason: SURG

## 2024-03-05 RX ADMIN — SODIUM CHLORIDE, SODIUM LACTATE, POTASSIUM CHLORIDE, CALCIUM CHLORIDE: 600; 310; 30; 20 INJECTION, SOLUTION INTRAVENOUS at 08:37:00

## 2024-03-05 RX ADMIN — LIDOCAINE HYDROCHLORIDE 50 MG: 10 INJECTION, SOLUTION EPIDURAL; INFILTRATION; INTRACAUDAL; PERINEURAL at 08:27:00

## 2024-03-05 NOTE — DISCHARGE INSTRUCTIONS

## 2024-03-05 NOTE — ANESTHESIA PREPROCEDURE EVALUATION
PRE-OP EVALUATION    Patient Name: Riky Nixon    Admit Diagnosis: Eosinophilic esophagitis [K20.0]  Gastroesophageal reflux disease with esophagitis without hemorrhage [K21.00]  Hiatal hernia [K44.9]    Pre-op Diagnosis: Eosinophilic esophagitis [K20.0]  Gastroesophageal reflux disease with esophagitis without hemorrhage [K21.00]  Hiatal hernia [K44.9]    ESOPHAGOGASTRODUODENOSCOPY (EGD)    Anesthesia Procedure: ESOPHAGOGASTRODUODENOSCOPY (EGD)    Surgeon(s) and Role:     * Michael Rodrigez MD - Primary    Pre-op vitals reviewed.        Body mass index is 36.75 kg/m².    Current medications reviewed.  Hospital Medications:  No current facility-administered medications on file as of 3/5/2024.       Outpatient Medications:     Medications Prior to Admission   Medication Sig Dispense Refill Last Dose    Omeprazole 40 MG Oral Capsule Delayed Release Take 1 capsule (40 mg total) by mouth daily. 30 minutes before breakfast       levothyroxine 200 MCG Oral Tab Take 1 tablet (200 mcg total) by mouth before breakfast. 90 tablet 1     losartan-hydroCHLOROthiazide 50-12.5 MG Oral Tab Take 1 tablet by mouth once daily. 90 tablet 1     [employer] Fiber Gummies 2 g Oral Chew Tab Chew by mouth.       triamcinolone 55 MCG/ACT Nasal Aerosol 1 spray by Nasal route daily as needed.          Allergies: Amoxicillin and Sulfa drugs cross reactors      Anesthesia Evaluation        Anesthetic Complications           GI/Hepatic/Renal      (+) GERD          (+) liver disease                 Cardiovascular        Exercise tolerance: good     MET: >4    (+) obesity  (+) hypertension                                     Endo/Other           (+) hypothyroidism                       Pulmonary                    (+) sleep apnea       Neuro/Psych                 (+) neuromuscular disease                     Past Surgical History:   Procedure Laterality Date    COLONOSCOPY  Oct 2022    SINUS SURGERY         Social History     Socioeconomic History     Marital status:    Tobacco Use    Smoking status: Never    Smokeless tobacco: Never   Vaping Use    Vaping Use: Never used   Substance and Sexual Activity    Alcohol use: No    Drug use: No   Other Topics Concern    Caffeine Concern Yes     Comment: 2-3 cans of Diet Coke daily.    Stress Concern Yes    Weight Concern Yes    Special Diet No    Exercise No    Seat Belt Yes     History   Drug Use No     Available pre-op labs reviewed.  Lab Results   Component Value Date    WBC 6.5 02/27/2024    RBC 5.33 02/27/2024    HGB 15.8 02/27/2024    HCT 47.6 02/27/2024    MCV 89.3 02/27/2024    MCH 29.6 02/27/2024    MCHC 33.2 02/27/2024    RDW 13.4 02/27/2024     02/27/2024     Lab Results   Component Value Date     02/27/2024    K 3.9 02/27/2024     02/27/2024    CO2 30 02/27/2024    BUN 14 02/27/2024    CREATSERUM 1.11 02/27/2024    GLU 95 02/27/2024    CA 9.4 02/27/2024            Airway      Mallampati: III  Mouth opening: >3 FB  TM distance: > 6 cm  Neck ROM: full Cardiovascular      Rhythm: regular  Rate: normal     Dental    Dentition appears grossly intact         Pulmonary    Pulmonary exam normal.                 Other findings              ASA: 3   Plan: MAC  NPO status verified and patient meets guidelines.    Post-procedure pain management plan discussed with surgeon and patient.    Comment:    Plan is MAC anesthesia, which may include deep sedation.  Implied that memory of procedure is unlikely although intraop recall, if it occurs, may be a reasonable and comfortable experience with this anesthetic.  Aware that general anesthesia is not intended though deep sedation may include occasional moments of general anesthesia.  The backup plan for safe patient care may include change of plan to full general anesthesia with potential airway placement.  Patient (legal guardian) demonstrated understanding, accepts risks and benefits, and wishes to proceed as reflected in the signed consent  form.  Difficulty airway equipment available as needed, may need general anesthesia OETT.  Previous anesthesia records reviewed.  Plan/risks discussed with: patient              Plan/risks discussed with: patient  Use of blood product(s) discussed with: patient    Consented to blood products.          Present on Admission:  **None**

## 2024-03-05 NOTE — OPERATIVE REPORT
Riky Nixon Patient Status:  Hospital Outpatient Surgery    1975 MRN [id number]   [facility] ENDOSCOPY [facility] Attending Michael Rodrigez MD   Date 3/5/2024 PCP Meera Zepeda MD     PREOPERATIVE DIAGNOSIS/INDICATION: EoE  POSTOPERTATIVE DIAGNOSIS: Same, hiatal hernia, Schatzki's ring  PROCEDURE PERFORMED: EGD with biopsy  TIME OUT WAS PERFORMED     SEDATION: MAC sedation provided by General Anesthesia     INFORMED CONSENT: Risks, benefits and alternatives to the procedure were explained to the patient including but not limited to bleeding, infection, perforation, adverse drug reactions, pancreatitis and the need for hospitalization and surgery if this occurs, the patient understands and agrees to procedure.  PROCEDURE DESCRIPTION: A regular upper endoscope was introduced into the patient’s mouth, hypo pharynx, esophagus, stomach and the first and second portion of the duodenum, retroflexion of the endoscope was performed in the stomach. Careful examination of the above described areas was performed on withdrawal of the endoscope. The patient tolerated the procedure well, there were no immediate complication immediately following the procedure, and the patient was transferred to recovery in stable condition.  FINDINGS:  ESOPHAGUS: Subtle superficial mucosal furrowing, and punctate white mid to proximal esophageal exudate  EGJ: Small sliding hiatal hernia 2 cm Hill grade 2  STOMACH: Normal  DUODENUM: Normal  THERAPEUTICS: Cold forceps biopsies were performed from esophagus  RECOMMENDATIONS:   Post EGD precautions, watch for bleeding, infection, perforation, adverse drug reactions   Follow biopsies.    Michael Rodrigez MD  3/5/2024  8:35 AM

## 2024-03-05 NOTE — ANESTHESIA POSTPROCEDURE EVALUATION
[facility]    Riky Nixon Patient Status:  Hospital Outpatient Surgery   Age/Gender 48 year old male MRN [id number]   Location [facility] ENDOSCOPY [facility]IN [facility] Attending Michael Rodrigez MD   Hosp Day # 0 PCP Meera Zepeda MD       Anesthesia Post-op Note    ESOPHAGOGASTRODUODENOSCOPY (EGD) with biopsies    Procedure Summary       Date: 03/05/24 Room / Location:  ENDOSCOPY 02 /  ENDOSCOPY    Anesthesia Start: 0822 Anesthesia Stop: 0838    Procedure: ESOPHAGOGASTRODUODENOSCOPY (EGD) with biopsies Diagnosis:       Eosinophilic esophagitis      Gastroesophageal reflux disease with esophagitis without hemorrhage      Hiatal hernia      (Allergic esohagitis, schatskis ring, hiatal hernia)    Surgeons: Michael Rodrigez MD Anesthesiologist: Sly Ro MD    Anesthesia Type: MAC ASA Status: 3            Anesthesia Type: MAC    Vitals Value Taken Time   /74 03/05/24 0838   Temp 97.6 03/05/24 0839   Pulse 91 03/05/24 0839   Resp 18 03/05/24 0839   SpO2 97 % 03/05/24 0839   Vitals shown include unfiled device data.    Patient Location: PACU    Anesthesia Type: general    Airway Patency: patent    Postop Pain Control: adequate    Nausea/Vomiting: none    Cardiopulmonary/Hydration status: stable euvolemic    Complications: no apparent anesthesia related complications    Postop vital signs: stable    Dental Exam: Unchanged from Preop    Patient to be discharged from PACU when criteria met.

## 2024-03-05 NOTE — H&P
[facility]  Pre-op H and P    Riky Nixon Patient Status:  Hospital Outpatient Surgery    1975 MRN [id number]   Location [facility] ENDOSCOPY [facility]IN [facility] Attending [place] Rodrigez MD   Date 3/5/2024 PCP Meera Zepeda MD     CC:   Eosinophilic esophagitis   Gastroesophageal reflux disease with esophagitis without hemorrhage   Hiatal hernia       History of Present Illness:  Riky Nixon is a a(n) 48 year old male.   Eosinophilic esophagitis   Gastroesophageal reflux disease with esophagitis without hemorrhage   Hiatal hernia       History:  Past Medical History:   Diagnosis Date    Belching     Black stools 2022    Post Sinus surgery    Blood in the stool 2022    Post sinus surgery    COVID-19 2022    cold symptoms.    Diarrhea, unspecified     When sick    Disorder of thyroid     Esophageal reflux     Fatigue     When sick    Fever     When sick    Flatulence/gas pain/belching     Headache disorder     Heartburn Years    High blood pressure     Hx of motion sickness     as a child    Loss of appetite     When sick    Nausea     When sick    Night sweats     When sick    Problems with swallowing 2022    Sleep apnea     cpap    Sleep disturbance     When sick    Thyroid disease     Vomiting     When sick     Past Surgical History:   Procedure Laterality Date    COLONOSCOPY  Oct 2022    SINUS SURGERY         Family History   Problem Relation Age of Onset    Cancer Other         Colon, Fam hx, Grandfather    Diabetes Other         Grandmother    Hypertension Father     Thyroid Disorder Mother     Cancer Maternal Grandmother         uterine    Diabetes Maternal Grandfather       reports that he has never smoked. He has never used smokeless tobacco. He reports that he does not drink alcohol and does not use drugs.    Allergies:  Allergies   Allergen Reactions    Amoxicillin OTHER (SEE COMMENTS)     Pt states it has not worked in the past      Sulfa Drugs Cross Reactors RASH        Medications:    Current Facility-Administered Medications:     lactated ringers infusion, , Intravenous, Continuous    Physical Exam:    Blood pressure 111/64, pulse 70, temperature 98.3 °F (36.8 °C), temperature source Temporal, resp. rate 20, height 6' (1.829 m), weight 270 lb (122.5 kg), SpO2 95%.    General: Appears alert, oriented x3 and in no acute distress.  CV: Normal rate   Lungs: Normal effort   Skin: Warm and dry.  Laboratory Data:         Assessment/Plan/Procedure:  Patient Active Problem List   Diagnosis    Hypothyroidism    Pure hyperglyceridemia    Essential hypertension    Elevated liver enzymes    Obesity (BMI 30-39.9)    Dysphagia, unspecified    Chronic superficial gastritis without bleeding    Gastroesophageal reflux disease without esophagitis    Diaphragmatic hernia without obstruction or gangrene    ALT (SGPT) level raised    Fatty liver       Eosinophilic esophagitis   Gastroesophageal reflux disease with esophagitis without hemorrhage   Hiatal hernia       Plan:  EGD    Michael Rodrigez MD  3/5/2024  8:25 AM

## 2024-04-29 DIAGNOSIS — I10 ESSENTIAL HYPERTENSION: ICD-10-CM

## 2024-04-29 DIAGNOSIS — E03.9 HYPOTHYROIDISM, UNSPECIFIED TYPE: ICD-10-CM

## 2024-04-29 RX ORDER — LEVOTHYROXINE SODIUM 0.2 MG/1
200 TABLET ORAL
Qty: 90 TABLET | Refills: 0 | Status: SHIPPED | OUTPATIENT
Start: 2024-04-29

## 2024-04-29 RX ORDER — LOSARTAN POTASSIUM AND HYDROCHLOROTHIAZIDE 12.5; 5 MG/1; MG/1
1 TABLET ORAL
Qty: 90 TABLET | Refills: 0 | Status: SHIPPED | OUTPATIENT
Start: 2024-04-29

## 2024-04-29 NOTE — TELEPHONE ENCOUNTER
Requesting   Name from pharmacy: Levothyroxine Sodium 200 MCG Oral Tablet          Will file in chart as: LEVOTHYROXINE 200 MCG Oral Tab    Sig: TAKE 1 TABLET BY MOUTH BEFORE BREAKFAST    Disp: 90 tablet    Refills: 0    Start: 4/29/2024    Class: Normal    Non-formulary For: Hypothyroidism, unspecified type    Last ordered: 6 months ago (10/30/2023) by Meera Zepeda MD    Last refill: 1/28/2024    Rx #: [id number]    Thyroid Medication Protocol Oyflen2404/29/2024 10:16 AM   Protocol Details TSH in past 12 months    Last TSH value is normal    In person appointment or virtual visit in the past 12 mos or appointment in next 3 mos       Name from pharmacy: Losartan Potassium-HCTZ 50-12.5 MG Oral Tablet         Will file in chart as: LOSARTAN-HYDROCHLOROTHIAZIDE 50-12.5 MG Oral Tab    Sig: Take 1 tablet by mouth once daily.    Original sig: Take 1 tablet by mouth once daily    Disp: 90 tablet    Refills: 0    Start: 4/29/2024    Class: Normal    Non-formulary For: Essential hypertension    Last ordered: 6 months ago (10/30/2023) by Meera Zepeda MD    Last refill: 1/28/2024    Rx #: [id number]    Hypertension Medications Protocol Zwiwev7504/29/2024 10:16 AM   Protocol Details Last BP reading less than 140/90    CMP or BMP in past 12 months    In person appointment or virtual visit in the past 12 mos or appointment in next 3 mos    EGFRCR or GFRNAA > 50        LOV: 2/29/2024  RTC: 6 months   Last Relevant Labs: 2/28/2024  Filled: 10/30/2023 #90 with 1 refills    Future Appointments   Date Time Provider Department Center   6/6/2024  2:10 PM YONATHAN, PROCEDURE [id number] None

## 2024-05-03 ENCOUNTER — OFFICE VISIT (OUTPATIENT)
Dept: INTERNAL MEDICINE CLINIC | Facility: CLINIC | Age: 49
End: 2024-05-03
Payer: COMMERCIAL

## 2024-05-03 VITALS
DIASTOLIC BLOOD PRESSURE: 80 MMHG | WEIGHT: 250.63 LBS | SYSTOLIC BLOOD PRESSURE: 128 MMHG | TEMPERATURE: 98 F | HEIGHT: 72 IN | BODY MASS INDEX: 33.95 KG/M2 | OXYGEN SATURATION: 96 % | HEART RATE: 76 BPM

## 2024-05-03 DIAGNOSIS — J01.10 ACUTE NON-RECURRENT FRONTAL SINUSITIS: Primary | ICD-10-CM

## 2024-05-03 PROCEDURE — 99213 OFFICE O/P EST LOW 20 MIN: CPT | Performed by: FAMILY MEDICINE

## 2024-05-03 RX ORDER — AZITHROMYCIN 250 MG/1
TABLET, FILM COATED ORAL
Qty: 6 TABLET | Refills: 0 | Status: SHIPPED | OUTPATIENT
Start: 2024-05-03 | End: 2024-05-07

## 2024-05-03 NOTE — PROGRESS NOTES
Riky Nixon is a 48 year old male.  HPI:   Here to check on his URI s/s    3d ago started w R>L ear pain    frontal pressure    fatigued      no aches   No NVD    occ PND    Slight sore throat   Other family members were sick as well      not coughing      +tylenol sinus            Current Outpatient Medications   Medication Sig Dispense Refill    levothyroxine 200 MCG Oral Tab Take 1 tablet (200 mcg total) by mouth before breakfast. 90 tablet 0    losartan-hydroCHLOROthiazide 50-12.5 MG Oral Tab Take 1 tablet by mouth once daily. 90 tablet 0    Budesonide 1 MG/2ML Inhalation Suspension 1 mg/2 ml  po twice daily: Mix 5 packets of Splenda or mix with honey as slurry, swallow twice daily. Avoid eating or drinking x 1 hour after each use. Gargle water  for 30 secs at the back of the throat then spit after each use. 180 each 3    omeprazole 20 MG Oral Capsule Delayed Release Take one capsule (20 mg total) by mouth once daily, 30 minutes prior to breakfast. 90 capsule 3    [employer] Fiber Gummies 2 g Oral Chew Tab Chew by mouth.      triamcinolone 55 MCG/ACT Nasal Aerosol 1 spray by Nasal route daily as needed.        Past Medical History:    Belching    Black stools    Post Sinus surgery    Blood in the stool    Post sinus surgery    COVID-19    cold symptoms.    Diarrhea, unspecified    When sick    Disorder of thyroid    Esophageal reflux    Fatigue    When sick    Fever    When sick    Flatulence/gas pain/belching    Headache disorder    Heartburn    High blood pressure    Hx of motion sickness    as a child    Loss of appetite    When sick    Nausea    When sick    Night sweats    When sick    Problems with swallowing    Sleep apnea    cpap    Sleep disturbance    When sick    Thyroid disease    Vomiting    When sick      Social History:  Social History     Socioeconomic History    Marital status:    Tobacco Use    Smoking status: Never    Smokeless tobacco: Never   Vaping Use    Vaping status: Never Used    Substance and Sexual Activity    Alcohol use: No    Drug use: No   Other Topics Concern    Caffeine Concern Yes     Comment: 2-3 cans of Diet Coke daily.    Stress Concern Yes    Weight Concern Yes    Special Diet No    Exercise No    Seat Belt Yes        REVIEW OF SYSTEMS:   GENERAL HEALTH: feels well otherwise       EXAM:   /80   Pulse 76   Temp 98 °F (36.7 °C) (Temporal)   Ht 6' (1.829 m)   Wt 250 lb 9.6 oz (113.7 kg)   SpO2 96%   BMI 33.99 kg/m²   GENERAL: well developed, well nourished,in no apparent distress  SKIN: no rashes  TMs nl  throat nl      NECK: supple,no adenopathy  LUNGS: clear to auscultation     ASSESSMENT AND PLAN:   1. Acute non-recurrent frontal sinusitis  Will treat w zpak     component of allergies as well    can use otc claritin    update us w progress     The patient indicates understanding of these issues and agrees to the plan.  .

## 2024-06-05 ENCOUNTER — ANESTHESIA EVENT (OUTPATIENT)
Dept: ENDOSCOPY | Facility: HOSPITAL | Age: 49
End: 2024-06-05
Payer: COMMERCIAL

## 2024-06-06 ENCOUNTER — HOSPITAL ENCOUNTER (OUTPATIENT)
Facility: HOSPITAL | Age: 49
Setting detail: HOSPITAL OUTPATIENT SURGERY
Discharge: HOME | End: 2024-06-06
Attending: INTERNAL MEDICINE | Admitting: INTERNAL MEDICINE
Payer: COMMERCIAL

## 2024-06-06 ENCOUNTER — ANESTHESIA (OUTPATIENT)
Dept: ENDOSCOPY | Facility: HOSPITAL | Age: 49
End: 2024-06-06
Payer: COMMERCIAL

## 2024-06-06 VITALS
DIASTOLIC BLOOD PRESSURE: 69 MMHG | HEIGHT: 72 IN | BODY MASS INDEX: 33.86 KG/M2 | OXYGEN SATURATION: 97 % | HEART RATE: 67 BPM | TEMPERATURE: 98 F | RESPIRATION RATE: 18 BRPM | SYSTOLIC BLOOD PRESSURE: 108 MMHG | WEIGHT: 250 LBS

## 2024-06-06 DIAGNOSIS — K20.0 EOSINOPHILIC ESOPHAGITIS: ICD-10-CM

## 2024-06-06 PROCEDURE — 0DB58ZX EXCISION OF ESOPHAGUS, VIA NATURAL OR ARTIFICIAL OPENING ENDOSCOPIC, DIAGNOSTIC: ICD-10-PCS | Performed by: INTERNAL MEDICINE

## 2024-06-06 PROCEDURE — 0DB48ZX EXCISION OF ESOPHAGOGASTRIC JUNCTION, VIA NATURAL OR ARTIFICIAL OPENING ENDOSCOPIC, DIAGNOSTIC: ICD-10-PCS | Performed by: INTERNAL MEDICINE

## 2024-06-06 PROCEDURE — 88305 TISSUE EXAM BY PATHOLOGIST: CPT | Performed by: INTERNAL MEDICINE

## 2024-06-06 RX ORDER — LIDOCAINE HYDROCHLORIDE 10 MG/ML
INJECTION, SOLUTION EPIDURAL; INFILTRATION; INTRACAUDAL; PERINEURAL AS NEEDED
Status: DISCONTINUED | OUTPATIENT
Start: 2024-06-06 | End: 2024-06-06 | Stop reason: SURG

## 2024-06-06 RX ORDER — SODIUM CHLORIDE, SODIUM LACTATE, POTASSIUM CHLORIDE, CALCIUM CHLORIDE 600; 310; 30; 20 MG/100ML; MG/100ML; MG/100ML; MG/100ML
INJECTION, SOLUTION INTRAVENOUS CONTINUOUS PRN
Status: DISCONTINUED | OUTPATIENT
Start: 2024-06-06 | End: 2024-06-06 | Stop reason: SURG

## 2024-06-06 RX ORDER — SODIUM CHLORIDE, SODIUM LACTATE, POTASSIUM CHLORIDE, CALCIUM CHLORIDE 600; 310; 30; 20 MG/100ML; MG/100ML; MG/100ML; MG/100ML
INJECTION, SOLUTION INTRAVENOUS CONTINUOUS
Status: DISCONTINUED | OUTPATIENT
Start: 2024-06-06 | End: 2024-06-06

## 2024-06-06 RX ADMIN — SODIUM CHLORIDE, SODIUM LACTATE, POTASSIUM CHLORIDE, CALCIUM CHLORIDE: 600; 310; 30; 20 INJECTION, SOLUTION INTRAVENOUS at 14:28:00

## 2024-06-06 RX ADMIN — LIDOCAINE HYDROCHLORIDE 25 MG: 10 INJECTION, SOLUTION EPIDURAL; INFILTRATION; INTRACAUDAL; PERINEURAL at 14:35:00

## 2024-06-06 NOTE — H&P
[facility]  Pre-op H and P    Riky Nixon Patient Status:  Hospital Outpatient Surgery    1975 MRN [id number]   Location [facility] ENDOSCOPY PAIN CENTER Attending Michael Rodrigez MD   Date 2024 PCP Meera Zepeda MD     CC: Eosinophilic esophagitis has tried PPI without a response. Will trial Dupixen and EGD after 8 weeks of starting therapy     History of Present Illness:  Riky Nixon is a a(n) 48 year old male. Eosinophilic esophagitis has tried PPI without a response. Will trial Dupixen and EGD after 8 weeks of starting therapy     History:  Past Medical History:    Belching    Black stools    Post Sinus surgery    Blood in the stool    Post sinus surgery    COVID-19    cold symptoms.    Diarrhea, unspecified    When sick    Disorder of liver    FATTY LIVER    Disorder of thyroid    Esophageal reflux    Fatigue    When sick    Fever    When sick    Flatulence/gas pain/belching    Headache disorder    Heartburn    High blood pressure    Hx of motion sickness    Loss of appetite    When sick    Nausea    When sick    Night sweats    When sick    Problems with swallowing    Sleep disturbance    When sick    Thyroid disease    Vomiting    When sick     Past Surgical History:   Procedure Laterality Date    Colonoscopy  Oct 2022    Colonoscopy      Sinus surgery        Upper gi endoscopy,exam       Family History   Problem Relation Age of Onset    Cancer Other         Colon, Fam hx, Grandfather    Diabetes Other         Grandmother    Hypertension Father     Thyroid Disorder Mother     Cancer Maternal Grandmother         uterine    Diabetes Maternal Grandfather       reports that he has never smoked. He has never used smokeless tobacco. He reports that he does not drink alcohol and does not use drugs.    Allergies:  Allergies   Allergen Reactions    Amoxicillin OTHER (SEE COMMENTS)     Pt states it has not worked in the past      Sulfa Drugs Cross Reactors RASH        Medications:  No current facility-administered medications for this encounter.    Physical Exam:    Height 6' (1.829 m), weight 250 lb (113.4 kg).    General: Appears alert, oriented x3 and in no acute distress.  CV: Normal rate   Lungs: Normal effort   Skin: Warm and dry.  Laboratory Data:       Imaging:      Assessment/Plan/Procedure:  Patient Active Problem List   Diagnosis    Hypothyroidism    Pure hyperglyceridemia    Essential hypertension    Elevated liver enzymes    Obesity (BMI 30-39.9)    Dysphagia, unspecified    Chronic superficial gastritis without bleeding    Gastroesophageal reflux disease without esophagitis    Diaphragmatic hernia without obstruction or gangrene    ALT (SGPT) level raised    Fatty liver       Eosinophilic esophagitis has tried PPI without a response. Will trial Dupixen and EGD after 8 weeks of starting therapy     Plan:  EGD    Michael Rodrigez MD  6/6/2024  1:11 PM

## 2024-06-06 NOTE — OPERATIVE REPORT
Riky Nixon Patient Status:  Hospital Outpatient Surgery    1975 MRN [id number]   [facility] ENDOSCOPY PAIN CENTER Attending Michael Rodrigez MD   Date 2024 PCP Meera Zepeda MD     PREOPERATIVE DIAGNOSIS/INDICATION: EoE  POSTOPERTATIVE DIAGNOSIS: Changes suggestive of EoE, irregular z line, hiatal hernia  PROCEDURE PERFORMED: EGD with biopsy  TIME OUT WAS PERFORMED    SEDATION: MAC sedation provided by General Anesthesia    INFORMED CONSENT: Risks, benefits and alternatives to the procedure were explained to the patient including but not limited to bleeding, infection, perforation, adverse drug reactions, pancreatitis and the need for hospitalization and surgery if this occurs, the patient understands and agrees to procedure.  PROCEDURE DESCRIPTION: A regular upper endoscope was introduced into the patient’s mouth, hypo pharynx, esophagus, stomach and the first and second portion of the duodenum, retroflexion of the endoscope was performed in the stomach. Careful examination of the above described areas was performed on withdrawal of the endoscope. The patient tolerated the procedure well, there were no immediate complication immediately following the procedure, and the patient was transferred to recovery in stable condition.  FINDINGS:  ESOPHAGUS: Subtle corrugated esophageal mucosal pattern  EGJ: Irregular Z-line, Small sliding hiatal hernia 2 cm Hill grade 2   STOMACH: Normal  DUODENUM: Normal  THERAPEUTICS: Cold forceps biopsies were performed from esophagus and GEJ  RECOMMENDATIONS:   Post EGD precautions, watch for bleeding, infection, perforation, adverse drug reactions   Follow biopsies.    Michael Rodrigez MD  2024  2:44 PM

## 2024-06-06 NOTE — DISCHARGE INSTRUCTIONS

## 2024-06-06 NOTE — ANESTHESIA PREPROCEDURE EVALUATION
PRE-OP EVALUATION    Patient Name: Riky Nixon    Admit Diagnosis: Eosinophilic esophagitis [K20.0]    Pre-op Diagnosis: Eosinophilic esophagitis [K20.0]    ESOPHAGOGASTRODUODENOSCOPY (EGD)    Anesthesia Procedure: ESOPHAGOGASTRODUODENOSCOPY (EGD)    Surgeons and Role:     * Michael Rodrigez MD - Primary    Pre-op vitals reviewed.  Temp: 98.4 °F (36.9 °C)  Pulse: 72  Resp: 16  BP: 121/82  SpO2: 99 %  Body mass index is 33.91 kg/m².    Current medications reviewed.  Hospital Medications:  No current facility-administered medications on file as of 6/6/2024.       Outpatient Medications:     Medications Prior to Admission   Medication Sig Dispense Refill Last Dose    levothyroxine 200 MCG Oral Tab Take 1 tablet (200 mcg total) by mouth before breakfast. 90 tablet 0 6/5/2024    losartan-hydroCHLOROthiazide 50-12.5 MG Oral Tab Take 1 tablet by mouth once daily. 90 tablet 0 6/5/2024    Budesonide 1 MG/2ML Inhalation Suspension 1 mg/2 ml  po twice daily: Mix 5 packets of Splenda or mix with honey as slurry, swallow twice daily. Avoid eating or drinking x 1 hour after each use. Gargle water  for 30 secs at the back of the throat then spit after each use. 180 each 3 6/5/2024    omeprazole 20 MG Oral Capsule Delayed Release Take one capsule (20 mg total) by mouth once daily, 30 minutes prior to breakfast. 90 capsule 3 6/5/2024    [employer] Fiber Gummies 2 g Oral Chew Tab Chew by mouth.   Past Month    triamcinolone 55 MCG/ACT Nasal Aerosol 1 spray by Nasal route daily as needed.   Past Month       Allergies: Amoxicillin and Sulfa drugs cross reactors      Anesthesia Evaluation    Patient summary reviewed.    Anesthetic Complications  (-) history of anesthetic complications         GI/Hepatic/Renal      (+) GERD                           Cardiovascular        Exercise tolerance: good     MET: >4    (+) obesity  (+) hypertension   (+) hyperlipidemia                                  Endo/Other           (+)  hypothyroidism                       Pulmonary    Negative pulmonary ROS.                       Neuro/Psych    Negative neuro/psych ROS.                                  Past Surgical History:   Procedure Laterality Date    Colonoscopy  Oct 2022    Colonoscopy      Sinus surgery        Upper gi endoscopy,exam       Social History     Socioeconomic History    Marital status:    Tobacco Use    Smoking status: Never    Smokeless tobacco: Never   Vaping Use    Vaping status: Never Used   Substance and Sexual Activity    Alcohol use: No    Drug use: No   Other Topics Concern    Caffeine Concern Yes     Comment: 2-3 cans of Diet Coke daily.    Stress Concern Yes    Weight Concern Yes    Special Diet No    Exercise No    Seat Belt Yes     History   Drug Use No     Available pre-op labs reviewed.               Airway      Mallampati: II  Mouth opening: >3 FB  TM distance: > 6 cm  Neck ROM: full Cardiovascular    Cardiovascular exam normal.  Rhythm: regular  Rate: normal     Dental    Dentition appears grossly intact         Pulmonary    Pulmonary exam normal.  Breath sounds clear to auscultation bilaterally.               Other findings              ASA: 2   Plan: MAC  NPO status verified and patient meets guidelines.  Patient has not taken beta blockers in last 24 hours.  Post-procedure pain management plan discussed with surgeon and patient.      Plan/risks discussed with: patient and spouse                Present on Admission:  **None**

## 2024-06-06 NOTE — ANESTHESIA POSTPROCEDURE EVALUATION
[facility]    Riky Nixon Patient Status:  Hospital Outpatient Surgery   Age/Gender 48 year old male MRN [id number]   Location [facility] ENDOSCOPY PAIN CENTER Attending Michael Rodrigez MD   Hosp Day # 0 PCP Meera Zepeda MD       Anesthesia Post-op Note    ESOPHAGOGASTRODUODENOSCOPY (EGD) with biopsies    Procedure Summary       Date: 06/06/24 Room / Location:  ENDOSCOPY 02 /  ENDOSCOPY    Anesthesia Start: 1428 Anesthesia Stop: 1452    Procedure: ESOPHAGOGASTRODUODENOSCOPY (EGD) with biopsies Diagnosis:       Eosinophilic esophagitis      (hiatal hernia)    Surgeons: Michael Rodrigez MD Anesthesiologist: Steve Armenta MD    Anesthesia Type: MAC ASA Status: 2            Anesthesia Type: MAC    Vitals Value Taken Time   /69 06/06/24 1510        Pulse 72 06/06/24 1512   Resp 18 06/06/24 1510   SpO2 97 % 06/06/24 1512   Vitals shown include unfiled device data.    Patient Location: Endoscopy    Anesthesia Type: MAC    Airway Patency: patent    Postop Pain Control: adequate    Mental Status: mildly sedated but able to meaningfully participate in the post-anesthesia evaluation    Nausea/Vomiting: none    Cardiopulmonary/Hydration status: stable euvolemic    Complications: no apparent anesthesia related complications    Postop vital signs: stable    Dental Exam: Unchanged from Preop    Patient to be discharged home when criteria met.

## 2024-06-16 NOTE — PROGRESS NOTES
Date: 6/15/2024    To: Riky Nixon  : 1975    I hope this letter finds you doing well.  I am writing to inform you of the following:     The biopsies obtained at the time of your recent endoscopic procedure were benign and showed no evidence of infection or malignancy.     Please call the office at [phone] if there are any questions.    Regards,    Michael Rodrigez M.D.

## 2024-06-27 ENCOUNTER — TELEMEDICINE (OUTPATIENT)
Dept: INTERNAL MEDICINE CLINIC | Facility: CLINIC | Age: 49
End: 2024-06-27

## 2024-06-27 DIAGNOSIS — A08.4 STOMACH FLU: Primary | ICD-10-CM

## 2024-06-27 PROCEDURE — 99213 OFFICE O/P EST LOW 20 MIN: CPT | Performed by: FAMILY MEDICINE

## 2024-06-27 NOTE — PROGRESS NOTES
Virtual Video Check-In     This visit is conducted using Telemedicine with live, interactive video and audio.    Riky Nixon, who has verified his/her identification by name and , verbally consents to a Virtual/Telephone Check-In visit on 24.  Patient confirms that he/she is in the [facility] at the time of service.  Patient understands and accepts financial responsibility for any deductible, co-insurance and/or co-pays associated with this service.    TIME: 20 minutes      HPI: The patient complaints of diarrhea. Has had since Tuesday. Stool is described as loose brown now but was foul smelling yellowish at the beginning. Pt denies any blood or mucus in the stool.  Patient has had some cramping/pain. Has had 3-4 BM's in the past 24 hours. Denies any associated nausea or vomiting.  Patient does states though his stomach feels upset.   Patient also had fever to 100.7,chills,sweats,headaches,fatigue,body aches and gas.Pt has been taking Tylenol for the fever and pepto-bismal for the upset stomach.  Patient has been urinating, Appetite is decreased. Pt has been drinking fluids, eating applesauce and grapes. The diarrhea is worsened by eating. Patient possibly had exposure to a stomach virus. Pt is a  and flew to [place] over this past weekend.      ROS:  GENERAL:  Denies fever, chills,weight change, fatigue  SKIN: Denies rashes, skin lesions  EYES: Denies blurred vision or double vision  HENT: Denies congestion,sore throat or ear pain.   CHEST: Denies chest pain, or palpitations  LUNGS: Denies shortness of breath,wheezing or cough  GI: see HPI  MUSCULOSKELETAL: Denies any arthralgia,myalgias or swollen joints  LYMPH:  Denies lymphadenopathy  NEURO:  Denies headaches and lightheadedness.   PSYCH: denies depression or anxiety    ALLERGIES:  Allergies   Allergen Reactions    Amoxicillin OTHER (SEE COMMENTS)     Pt states it has not worked in the past      Sulfa Drugs Cross Reactors RASH        Current Outpatient Medications   Medication Sig Dispense Refill    levothyroxine 200 MCG Oral Tab Take 1 tablet (200 mcg total) by mouth before breakfast. 90 tablet 0    losartan-hydroCHLOROthiazide 50-12.5 MG Oral Tab Take 1 tablet by mouth once daily. 90 tablet 0    Budesonide 1 MG/2ML Inhalation Suspension 1 mg/2 ml  po twice daily: Mix 5 packets of Splenda or mix with honey as slurry, swallow twice daily. Avoid eating or drinking x 1 hour after each use. Gargle water  for 30 secs at the back of the throat then spit after each use. 180 each 3    omeprazole 20 MG Oral Capsule Delayed Release Take one capsule (20 mg total) by mouth once daily, 30 minutes prior to breakfast. 90 capsule 3    [employer] Fiber Gummies 2 g Oral Chew Tab Chew by mouth.      triamcinolone 55 MCG/ACT Nasal Aerosol 1 spray by Nasal route daily as needed.         Past Medical History:    Belching    Black stools    Post Sinus surgery    Blood in the stool    Post sinus surgery    COVID-19    cold symptoms.    Diarrhea, unspecified    When sick    Disorder of liver    FATTY LIVER    Disorder of thyroid    Esophageal reflux    Fatigue    When sick    Fever    When sick    Flatulence/gas pain/belching    Headache disorder    Heartburn    High blood pressure    Hx of motion sickness    Loss of appetite    When sick    Nausea    When sick    Night sweats    When sick    Problems with swallowing    Sleep disturbance    When sick    Thyroid disease    Vomiting    When sick      Past Surgical History:   Procedure Laterality Date    Colonoscopy  Oct 2022    Colonoscopy      Sinus surgery        Upper gi endoscopy,exam        Social History     Socioeconomic History    Marital status:    Tobacco Use    Smoking status: Never    Smokeless tobacco: Never   Vaping Use    Vaping status: Never Used   Substance and Sexual Activity    Alcohol use: No    Drug use: No   Other Topics Concern    Caffeine Concern Yes     Comment: 2-3 cans of Diet Coke daily.     Stress Concern Yes    Weight Concern Yes    Special Diet No    Exercise No    Seat Belt Yes       Family History   Problem Relation Age of Onset    Cancer Other         Colon, Fam hx, Grandfather    Diabetes Other         Grandmother    Hypertension Father     Thyroid Disorder Mother     Cancer Maternal Grandmother         uterine    Diabetes Maternal Grandfather         PE:  No vital signs were taken for this video visit.  GENERAL: well developed, well nourished, in no acute distress  SKIN: no rashes on visible parts of skin  HEENT: normocephalic, atraumatic, conjunctiva clear  LUNGS: regular breathing rate and effort with no audible respiratory distress  NEURO: A&Ox3  PSYCH: pleasant mood and affect, appropriate judgement and insight    ASSESSMENT/PLAN:    Encounter Diagnosis   Name Primary?    Stomach flu Yes       No orders of the defined types were placed in this encounter.      Meds & Refills for this Visit:  Requested Prescriptions      No prescriptions requested or ordered in this encounter       Imaging & Consults:  None    Rest.  Drink lots of fluids. Tylenol/ibuprofen for the fever.  Avoid dairy products while you have the diarrhea.  Avoid roughage like fresh crunchy fruits and vegetables until you start having normal stools.  Start with bland foods like bananas, rice,applesauce, toast, crackers.(BRAT DIET)  Advance your diet as tolerated.   Go to the ER immediately if you develop abdominal pain, vomiting or a fever.    Patient to call if he needs a note for his job,.  The patient indicates understanding of these issues and agrees to the plan.  The patient is asked to call if still not improving    *Please note that the following visit was completed using two-way, real-time interactive audio and/or video communication.  This has been done in good [place] to provide continuity of care in the best interest of the provider-patient relationship, due to the ongoing public health crisis/national emergency and  because of restrictions of visitation.  There are limitations of this visit as physical exam could not be fully performed.  Every conscious effort was taken to allow for sufficient and adequate time.  Included in this visit, time may have been spent reviewing labs, medications, radiology tests and decision-making.  Appropriate medical decision-making and tests are ordered as detailed in the plan of care above.  Coding/billing information is submitted for this visit based on complexity of care and/or time spent for the visit.      Start Time: 8:45 am  End Time: 9:05 am    Maria Teresa ALAS

## 2024-07-01 DIAGNOSIS — E03.9 HYPOTHYROIDISM, UNSPECIFIED TYPE: ICD-10-CM

## 2024-07-01 DIAGNOSIS — I10 ESSENTIAL HYPERTENSION: ICD-10-CM

## 2024-07-02 RX ORDER — LOSARTAN POTASSIUM AND HYDROCHLOROTHIAZIDE 12.5; 5 MG/1; MG/1
1 TABLET ORAL
Qty: 90 TABLET | Refills: 0 | Status: SHIPPED | OUTPATIENT
Start: 2024-07-02

## 2024-07-02 RX ORDER — LEVOTHYROXINE SODIUM 0.2 MG/1
200 TABLET ORAL
Qty: 90 TABLET | Refills: 0 | Status: SHIPPED | OUTPATIENT
Start: 2024-07-02

## 2024-08-14 ENCOUNTER — HOSPITAL ENCOUNTER (INPATIENT)
Facility: HOSPITAL | Age: 49
LOS: 2 days | Discharge: HOME | End: 2024-08-16
Attending: EMERGENCY MEDICINE | Admitting: HOSPITALIST
Payer: COMMERCIAL

## 2024-08-14 DIAGNOSIS — T78.3XXA ANGIOEDEMA, INITIAL ENCOUNTER: Primary | ICD-10-CM

## 2024-08-14 PROBLEM — K21.00 GASTROESOPHAGEAL REFLUX DISEASE WITH ESOPHAGITIS WITHOUT HEMORRHAGE: Status: ACTIVE | Noted: 2024-08-14

## 2024-08-14 PROBLEM — K20.90 ESOPHAGITIS: Status: ACTIVE | Noted: 2024-08-14

## 2024-08-14 LAB
CRP SERPL-MCNC: <0.4 MG/DL (ref ?–0.5)
ERYTHROCYTE [SEDIMENTATION RATE] IN BLOOD: 12 MM/HR
MRSA DNA SPEC QL NAA+PROBE: NEGATIVE
SARS-COV-2 RNA RESP QL NAA+PROBE: NOT DETECTED

## 2024-08-14 PROCEDURE — 99291 CRITICAL CARE FIRST HOUR: CPT | Performed by: STUDENT IN AN ORGANIZED HEALTH CARE EDUCATION/TRAINING PROGRAM

## 2024-08-14 PROCEDURE — 99291 CRITICAL CARE FIRST HOUR: CPT | Performed by: NURSE PRACTITIONER

## 2024-08-14 RX ORDER — MELATONIN
3 NIGHTLY PRN
Status: DISCONTINUED | OUTPATIENT
Start: 2024-08-14 | End: 2024-08-16

## 2024-08-14 RX ORDER — ACETAMINOPHEN 500 MG
1000 TABLET ORAL EVERY 8 HOURS PRN
Status: DISCONTINUED | OUTPATIENT
Start: 2024-08-14 | End: 2024-08-16

## 2024-08-14 RX ORDER — POLYETHYLENE GLYCOL 3350 17 G/17G
17 POWDER, FOR SOLUTION ORAL DAILY PRN
Status: DISCONTINUED | OUTPATIENT
Start: 2024-08-14 | End: 2024-08-16

## 2024-08-14 RX ORDER — BISACODYL 10 MG
10 SUPPOSITORY, RECTAL RECTAL
Status: DISCONTINUED | OUTPATIENT
Start: 2024-08-14 | End: 2024-08-16

## 2024-08-14 RX ORDER — METHYLPREDNISOLONE SODIUM SUCCINATE 40 MG/ML
40 INJECTION, POWDER, LYOPHILIZED, FOR SOLUTION INTRAMUSCULAR; INTRAVENOUS EVERY 6 HOURS
Status: DISCONTINUED | OUTPATIENT
Start: 2024-08-14 | End: 2024-08-16

## 2024-08-14 RX ORDER — PROCHLORPERAZINE EDISYLATE 5 MG/ML
5 INJECTION INTRAMUSCULAR; INTRAVENOUS EVERY 8 HOURS PRN
Status: DISCONTINUED | OUTPATIENT
Start: 2024-08-14 | End: 2024-08-16

## 2024-08-14 RX ORDER — METHYLPREDNISOLONE SODIUM SUCCINATE 125 MG/2ML
125 INJECTION, POWDER, LYOPHILIZED, FOR SOLUTION INTRAMUSCULAR; INTRAVENOUS ONCE
Status: COMPLETED | OUTPATIENT
Start: 2024-08-14 | End: 2024-08-14

## 2024-08-14 RX ORDER — ENOXAPARIN SODIUM 100 MG/ML
40 INJECTION SUBCUTANEOUS DAILY
Status: DISCONTINUED | OUTPATIENT
Start: 2024-08-14 | End: 2024-08-16

## 2024-08-14 RX ORDER — DIPHENHYDRAMINE HCL 25 MG
50 CAPSULE ORAL 4 TIMES DAILY
Status: DISCONTINUED | OUTPATIENT
Start: 2024-08-14 | End: 2024-08-16

## 2024-08-14 RX ORDER — BENZONATATE 100 MG/1
200 CAPSULE ORAL 3 TIMES DAILY PRN
Status: DISCONTINUED | OUTPATIENT
Start: 2024-08-14 | End: 2024-08-16

## 2024-08-14 RX ORDER — BUDESONIDE 0.5 MG/2ML
1 INHALANT ORAL 2 TIMES DAILY
Status: DISCONTINUED | OUTPATIENT
Start: 2024-08-14 | End: 2024-08-16

## 2024-08-14 RX ORDER — SENNOSIDES 8.6 MG
17.2 TABLET ORAL NIGHTLY PRN
Status: DISCONTINUED | OUTPATIENT
Start: 2024-08-14 | End: 2024-08-16

## 2024-08-14 RX ORDER — DIPHENHYDRAMINE HYDROCHLORIDE 50 MG/ML
25 INJECTION INTRAMUSCULAR; INTRAVENOUS ONCE
Status: COMPLETED | OUTPATIENT
Start: 2024-08-14 | End: 2024-08-14

## 2024-08-14 RX ORDER — ONDANSETRON 2 MG/ML
4 INJECTION INTRAMUSCULAR; INTRAVENOUS EVERY 6 HOURS PRN
Status: DISCONTINUED | OUTPATIENT
Start: 2024-08-14 | End: 2024-08-16

## 2024-08-14 RX ORDER — ECHINACEA PURPUREA EXTRACT 125 MG
1 TABLET ORAL
Status: DISCONTINUED | OUTPATIENT
Start: 2024-08-14 | End: 2024-08-16

## 2024-08-14 RX ORDER — BUDESONIDE 3 MG/1
6 CAPSULE, COATED PELLETS ORAL DAILY
Status: DISCONTINUED | OUTPATIENT
Start: 2024-08-14 | End: 2024-08-14

## 2024-08-14 RX ORDER — PANTOPRAZOLE SODIUM 20 MG/1
20 TABLET, DELAYED RELEASE ORAL
Status: DISCONTINUED | OUTPATIENT
Start: 2024-08-15 | End: 2024-08-14

## 2024-08-14 RX ORDER — LEVOTHYROXINE SODIUM 100 UG/1
200 TABLET ORAL
Status: DISCONTINUED | OUTPATIENT
Start: 2024-08-14 | End: 2024-08-16

## 2024-08-14 RX ORDER — ACETAMINOPHEN 500 MG
500 TABLET ORAL EVERY 4 HOURS PRN
Status: DISCONTINUED | OUTPATIENT
Start: 2024-08-14 | End: 2024-08-14

## 2024-08-14 RX ORDER — FAMOTIDINE 10 MG/ML
20 INJECTION, SOLUTION INTRAVENOUS ONCE
Status: COMPLETED | OUTPATIENT
Start: 2024-08-14 | End: 2024-08-14

## 2024-08-14 RX ORDER — SODIUM CHLORIDE, SODIUM LACTATE, POTASSIUM CHLORIDE, CALCIUM CHLORIDE 600; 310; 30; 20 MG/100ML; MG/100ML; MG/100ML; MG/100ML
INJECTION, SOLUTION INTRAVENOUS CONTINUOUS
Status: DISCONTINUED | OUTPATIENT
Start: 2024-08-14 | End: 2024-08-14

## 2024-08-14 RX ORDER — FAMOTIDINE 10 MG/ML
20 INJECTION, SOLUTION INTRAVENOUS 2 TIMES DAILY
Status: DISCONTINUED | OUTPATIENT
Start: 2024-08-14 | End: 2024-08-15

## 2024-08-14 RX ORDER — FAMOTIDINE 10 MG/ML
20 INJECTION, SOLUTION INTRAVENOUS 2 TIMES DAILY
Status: DISCONTINUED | OUTPATIENT
Start: 2024-08-14 | End: 2024-08-14

## 2024-08-14 NOTE — PLAN OF CARE
Pt admitted to ICU from [place] ER. Periorbital area & lips still slightly swollen, much improved per pt. No other complaints, no difficulty swallowing, breathing. Lungs clear. Given CL diet without issue.

## 2024-08-14 NOTE — CONSULTS
ICU  Critical Care APRN Progress Note    NAME: Riky Nixon - ROOM: OTFP/OTFP - MRN: [id number] - Age: 49 year old - :1975    History Of Present Illness:  Riky Nixon is a 49 year old male with PMHx significant for HTN, fatty liver, hypothyroid, GERD, and CARLOS presents ED with facial swelling to bottom lip, chin, and eyes.  Patient treated for allergic reaction.  Patient to be admitted to ICU for airway monitoring.    Patient arrives to ICU on cart.  Patient with clear speech, swelling to bottom lip and bilateral eyelids.  Patient notes he feels the swelling is better and denies any issues with swallowing or breathing    PMH:  Past Medical History:    COVID-19    cold symptoms.    Disorder of liver    FATTY LIVER    Disorder of thyroid    Esophageal reflux    Flatulence/gas pain/belching    Headache disorder    Heartburn    High blood pressure    Hx of motion sickness    Problems with swallowing    Sleep apnea    Thyroid disease       Social Hx:  Social History     Socioeconomic History    Marital status:    Tobacco Use    Smoking status: Never    Smokeless tobacco: Never   Vaping Use    Vaping status: Never Used   Substance and Sexual Activity    Alcohol use: No    Drug use: No   Other Topics Concern    Caffeine Concern Yes     Comment: 2-3 cans of Diet Coke daily.    Stress Concern Yes    Weight Concern Yes    Special Diet No    Exercise No    Seat Belt Yes     Social Determinants of Health     Financial Resource Strain: Low Risk  (7/15/2024)    Received from [facility]    Overall Financial Resource Strain (CARDIA)     Difficulty of Paying Living Expenses: Not hard at all   Food Insecurity: No Food Insecurity (7/15/2024)    Received from [facility]    Hunger Vital Sign     Worried About Running Out of Food in the Last Year: Never true     Ran Out of Food in the Last Year: Never true   Transportation Needs: No Transportation Needs (7/15/2024)     Received from [facility]    PRAPARE - Transportation     Lack of Transportation (Medical): No     Lack of Transportation (Non-Medical): No   Housing Stability: Low Risk  (7/15/2024)    Received from [facility]    Housing Stability Vital Sign     Unable to Pay for Housing in the Last Year: No     Number of Places Lived in the Last Year: 1     In the last 12 months, was there a time when you did not have a steady place to sleep or slept in a shelter (including now)?: No       Family Hx:  Family History   Problem Relation Age of Onset    Cancer Other         Colon, Fam hx, Grandfather    Diabetes Other         Grandmother    Hypertension Father     Thyroid Disorder Mother     Cancer Maternal Grandmother         uterine    Diabetes Maternal Grandfather          Review of Systems:   A comprehensive 10 point review of systems was completed.  Pertinent positives and negatives noted in the HPI.    OBJECTIVE  Vitals:  /70   Pulse 72   Temp 98.1 °F (36.7 °C)   Resp 18   Wt 245 lb (111.1 kg)   SpO2 100%   BMI 33.23 kg/m²                Physical Exam:    General Appearance: Alert, cooperative, no distress, appears stated age  Neck: No JVD, neck supple, no adenopathy, trachea midline, no carotid bruits  Lungs: Clear to auscultation bilaterally, respirations unlabored  Heart: Regular rate and rhythm, S1 and S2 normal, no murmur, rub or gallop  Abdomen: Soft, non-tender, bowel sounds active all four quadrants, no masses, no organomegaly  Extremities: Extremities normal, atraumatic, no cyanosis or edema,capillary refill <3 sec.    Pulses: 2+ and symmetric all extremities  Skin: Skin color, texture, turgor normal for ethnicity, no rashes or lesions, warm and dry  Neurologic: CNII-XII intact, normal strength    Data this admission:  No results found.    Labs:       Assessment/Plan:    Angioedema vs allergic reaction  -monitor airway  -continue benadryl PRN  -hold  losartan  -follow up with allergist outpatient    HTN  -hold home losartan  -IVP PRN medications    Hypothyroid  -continue home medication    F/E/N  -Clear liquid diet  -replete electrolytes as needed    Proph  -Lovenox  -SCD    Dispo  -full code  -ICU for risk of decompensation      Plan of care discussed with intensivist on-call Dr. Hurt.    Brynn Jones, [facility]  Critical Care NP  Phone [phone]      A total of 35 minutes of critical care time (exclusive of billable procedures) was administered. This involved direct patient intervention, complex decision making, and/or extensive discussions with the patient, family, and clinical staff.

## 2024-08-14 NOTE — PROGRESS NOTES
[facility] note: Duplicate Proton Pump Inhibitor with Histamine 2 blocker.    Riky Nixon is a 49 year old patient who has been prescribed both famotidine (Pepcid)  And pantoprazole (Protonix).  Pepcid was discontinued per P[employer] approved protocol for duplicate therapy in adult patients for medications not ordered by gastroenterology.    Thank you,  Skye Contreras, PharmD  8/14/2024

## 2024-08-14 NOTE — ED QUICK NOTES
Orders for admission, patient is aware of plan and ready to go upstairs. Any questions, please call ED TAYA scott at extension [phone].     Patient Covid vaccination status: Fully vaccinated     COVID Test Ordered in ED: Rapid SARS-CoV-2 by PCR    COVID Suspicion at Admission: N/A    Running Infusions:  None    Mental Status/LOC at time of transport: alert/oriented x4/4    Other pertinent information:   CIWA score: N/A   NIH score:  N/A

## 2024-08-14 NOTE — ED PROVIDER NOTES
Patient Seen in: [facility] [place]      History     Chief Complaint   Patient presents with    Allergic Rxn Allergies     Stated Complaint: allergy reaction started today 40 mins ago.    Subjective:   HPI    49-year-old gentleman.  In the last 8 months patient has had 3 total episodes of idiopathic angioedema.  Last episode 1 month prior to arrival.  In the last 90 minutes he has developed another episode.  This episode much more severe.  Moderate swelling to throat and tongue.  Periocular space.  Face.  Unsure of exposures.  Took 25 mg of Benadryl prior to arrival.    Objective:   Past Medical History:    Belching    Black stools    Post Sinus surgery    Blood in the stool    Post sinus surgery    COVID-19    cold symptoms.    Diarrhea, unspecified    When sick    Disorder of liver    FATTY LIVER    Disorder of thyroid    Esophageal reflux    Fatigue    When sick    Fever    When sick    Flatulence/gas pain/belching    Headache disorder    Heartburn    High blood pressure    Hx of motion sickness    Loss of appetite    When sick    Nausea    When sick    Night sweats    When sick    Problems with swallowing    Sleep apnea    Sleep disturbance    When sick    Thyroid disease    Vomiting    When sick              Past Surgical History:   Procedure Laterality Date    Colonoscopy  Oct 2022    Colonoscopy      Sinus surgery        Upper gi endoscopy,exam                  Social History     Socioeconomic History    Marital status:    Tobacco Use    Smoking status: Never    Smokeless tobacco: Never   Vaping Use    Vaping status: Never Used   Substance and Sexual Activity    Alcohol use: No    Drug use: No   Other Topics Concern    Caffeine Concern Yes     Comment: 2-3 cans of Diet Coke daily.    Stress Concern Yes    Weight Concern Yes    Special Diet No    Exercise No    Seat Belt Yes     Social Determinants of Health     Financial Resource Strain: Low Risk  (7/15/2024)    Received from  [facility]    Overall Financial Resource Strain (CARDIA)     Difficulty of Paying Living Expenses: Not hard at all   Food Insecurity: No Food Insecurity (7/15/2024)    Received from [facility]    Hunger Vital Sign     Worried About Running Out of Food in the Last Year: Never true     Ran Out of Food in the Last Year: Never true   Transportation Needs: No Transportation Needs (7/15/2024)    Received from [facility]    PRAPARE - Transportation     Lack of Transportation (Medical): No     Lack of Transportation (Non-Medical): No   Housing Stability: Low Risk  (7/15/2024)    Received from [facility]    Housing Stability Vital Sign     Unable to Pay for Housing in the Last Year: No     Number of Places Lived in the Last Year: 1     In the last 12 months, was there a time when you did not have a steady place to sleep or slept in a shelter (including now)?: No              Review of Systems    Positive for stated Chief Complaint: Allergic Rxn Allergies    Other systems are as noted in HPI.  Constitutional and vital signs reviewed.      All other systems reviewed and negative except as noted above.    Physical Exam     ED Triage Vitals [08/14/24 1414]   BP (!) 129/95   Pulse 79   Resp 16   Temp 98 °F (36.7 °C)   Temp src Temporal   SpO2 99 %   O2 Device None (Room air)       Current Vitals:   Vital Signs  BP: 112/70  Pulse: 80  Resp: 16  Temp: 98 °F (36.7 °C)  Temp src: Temporal    Oxygen Therapy  SpO2: 100 %  O2 Device: None (Room air)            Physical Exam    Gen: Well appearing, well groomed, alert and aware x 3  Neck: Supple, full range of motion, no thyromegaly or lymphadenopathy.  Eye examination: EOMs are intact, normal conjunctival  ENT:   Muffled voice.  Moderate swelling to tongue and posterior pharynx.  Handling oral secretions well.  Swelling to the upper and lower lips.  Swelling to the periocular space.  Lung: No  distress, RR, no retraction, breath sounds are clear bilaterally  Cardio: Regular rate and rhythm, normal S1-S2, no murmur appreciable  Skin: No sign of trauma, Skin warm and dry, no induration or sign of infection.  No rash noted      ED Course   Labs Reviewed - No data to display                   MDM      My supervising physician was involved in the management of this patient.  Admission disposition: 8/14/2024  3:23 PM         Patient has been taking losartan/hydrochlorothiazide for the last 10+ years.  Potential causation.  No other new foods or medications.    Muffled voice.  Moderate swelling to tongue and posterior pharynx.  Breath sounds clear.  No systemic skin reaction.  No desquamation or bulla.  No vesicles.    IV line established.  Patient received Benadryl, Pepcid and Solu-Medrol.  Intramuscular epinephrine administered.  Patient placed on the cardiac monitor    Patient observed closely.  At least 60% improvement within the first 20 minutes.  Will continue to observe.    Case discussed with the hospitalist and will admit to the ICU for potential rebound effect.                         Medical Decision Making      Disposition and Plan     Clinical Impression:  1. Angioedema, initial encounter         Disposition:  Admit  8/14/2024  3:23 pm    Follow-up:  No follow-up provider specified.        Medications Prescribed:  Current Discharge Medication List                            Hospital Problems       Present on Admission  Date Reviewed: 7/16/2024            ICD-10-CM Noted POA    * (Principal) Angioedema, initial encounter T78.3XXA 8/14/2024 Unknown

## 2024-08-14 NOTE — ED INITIAL ASSESSMENT (HPI)
Allergic reaction, 40 mins pta. Presents with lip swelling, eye redness. Took benadryl pta. With tongue swelling

## 2024-08-14 NOTE — H&P
[facility]     Riky Nixon Patient Status:  Inpatient    1975 MRN [id number]   Location [facility] 4SW-A Attending Saran Gallo MD   Hosp Day # 0 PCP Meera Zepeda MD     Chief Complaint: Facial swelling    History of Present Illness: Riky Nixon is a 49 year old male with PMHx Hypothyroidism, GERD, HTN, CARLOS who presents with repeat episode of tongue and throat swelling.     Patient states that he had first episode of angioedema early this year, states that he managed this at home, took Benadryl and symptoms resolved shortly.  States that earlier last month had similar episode of tongue swelling and again managed this at home with Benadryl.  Today at approximately 1:30 PM had recurrence of tingling in his lower lip, swelling of the tongue and feeling of tightness in his esophagus and thus presented to the ER for further evaluation.  Received steroids, Benadryl, famotidine, epinephrine in ER and now states that symptoms have largely resolved, only having slight swelling and tingling of the tongue remaining.  Denies any new medications, allergies.  No family history of similar angioedema.    Past Medical History:  Past Medical History:    COVID-19    cold symptoms.    Disorder of liver    FATTY LIVER    Disorder of thyroid    Esophageal reflux    Flatulence/gas pain/belching    Headache disorder    Heartburn    High blood pressure    Hx of motion sickness    Problems with swallowing    Sleep apnea    Thyroid disease        Past Surgical History:   Past Surgical History:   Procedure Laterality Date    Colonoscopy  Oct 2022    Colonoscopy      Sinus surgery        Upper gi endoscopy,exam         Social History:  reports that he has never smoked. He has never used smokeless tobacco. He reports that he does not drink alcohol and does not use drugs.    Family History:   Family History   Problem Relation Age of Onset    Cancer Other         Colon, Fam hx,  Grandfather    Diabetes Other         Grandmother    Hypertension Father     Thyroid Disorder Mother     Cancer Maternal Grandmother         uterine    Diabetes Maternal Grandfather        Allergies:   Allergies   Allergen Reactions    Amoxicillin OTHER (SEE COMMENTS)     Pt states it has not worked in the past      Sulfa Drugs Cross Reactors RASH       Medications:    No current facility-administered medications on file prior to encounter.     Current Outpatient Medications on File Prior to Encounter   Medication Sig Dispense Refill    LOSARTAN-HYDROCHLOROTHIAZIDE 50-12.5 MG Oral Tab Take 1 tablet by mouth once daily 90 tablet 0    LEVOTHYROXINE 200 MCG Oral Tab TAKE 1 TABLET BY MOUTH BEFORE BREAKFAST 90 tablet 0    Budesonide 1 MG/2ML Inhalation Suspension 1 mg/2 ml  po twice daily: Mix 5 packets of Splenda or mix with honey as slurry, swallow twice daily. Avoid eating or drinking x 1 hour after each use. Gargle water  for 30 secs at the back of the throat then spit after each use. 180 each 3    omeprazole 20 MG Oral Capsule Delayed Release Take one capsule (20 mg total) by mouth once daily, 30 minutes prior to breakfast. 90 capsule 3    triamcinolone 55 MCG/ACT Nasal Aerosol 1 spray by Nasal route daily as needed.         Review of Systems:   A comprehensive 14 point review of systems was completed.    Pertinent positives and negatives noted in the HPI.    Physical Exam:    /82   Pulse 80   Temp 98 °F (36.7 °C) (Temporal)   Resp 17   Wt 245 lb 13 oz (111.5 kg)   SpO2 91%   BMI 33.34 kg/m²   General: No acute distress. Alert and oriented x 3.  HEENT: Normocephalic atraumatic. Moist mucous membranes. EOM-I. PERRLA. Anicteric.  Mild swelling of lower lip.  No significant tongue swelling or oropharyngeal edema.  Neck: No lymphadenopathy. No JVD. No carotid bruits.  Respiratory: Clear to auscultation bilaterally. No wheezes. No rhonchi.  Cardiovascular: S1, S2. Regular rate and rhythm. No murmurs, rubs or  gallops. Equal pulses.   Chest and Back: No tenderness or deformity.  Abdomen: Soft, nontender, nondistended.  Positive bowel sounds. No rebound, guarding or organomegaly.  Neurologic: No focal neurological deficits. CNII-XII grossly intact.  Musculoskeletal: Moves all extremities.  Extremities: No edema or cyanosis.  Integument: No rashes or lesions.   Psychiatric: Appropriate mood and affect.    Diagnostic Data:      Labs:  No results for input(s): \"WBC\", \"HGB\", \"MCV\", \"PLT\", \"BAND\", \"INR\" in the last 168 hours.    Invalid input(s): \"LYM#\", \"MONO#\", \"BASOS#\", \"EOSIN#\"    No results for input(s): \"GLU\", \"BUN\", \"CREATSERUM\", \"GFRAA\", \"GFRNAA\", \"CA\", \"ALB\", \"NA\", \"K\", \"CL\", \"CO2\", \"ALKPHO\", \"AST\", \"ALT\", \"BILT\", \"TP\" in the last 168 hours.    CrCl cannot be calculated (Patient's most recent lab result is older than the maximum 7 days allowed.).    No results for input(s): \"PTP\", \"INR\" in the last 168 hours.    No results for input(s): \"TROP\", \"CK\" in the last 168 hours.    Imaging: Imaging data reviewed in Epic.  No results found.      ASSESSMENT / PLAN:     # Angioedema   - continue IV steroids, benadryl, famotidine   - close monitoring in ICU, pulm/CC on consult   - Hold ARB at dc   - C1 esterase, C4, ESR/CRP ordered    # Eosinophilic esophagitis - continue oral budesonide  # Hypertension - Hold home oral antihypertensives  # Obstructive Sleep Apnea - CPAP at night per protocol    # GERD - continue PPI      Code Status: Full Code    Plan of care discussed with patient, ED physician    Charan Vences MD  8/14/2024      Supplementary Documentation:      MDM : Patient's ER labs, imaging reviewed.  A.m. labs ordered.  ER management discussed with ED physician, decision made for patient to be admitted to the hospital for further medical management.    Total critical care time spent 40 minutes.

## 2024-08-14 NOTE — ED QUICK NOTES
Nephrology Progress Note      Patient: Brett Lewis               Sex: male            MRN:  [id number]      YOB: 1937      Age:  85 year old           Date: 3/3/2022    Subjective:   03/03/22 : Overnight, patient's CVVHDF line clotted, filter and circuit changed.  Patient was then suctioned, and patient's MAP dropped to the 20s to 30s.  Patient has continued to be on epinephrine and vasopressin for blood pressure support, is off sedation and SBT this morning which lasted for 45 minutes.  Cardiology team had a long conversation regarding the [place] catheter placement to monitor CVP, but the patient's family refused, as he may not survive the procedure.  Patient continues to move his arms and legs and mouth words this morning.  CVVHDF output yesterday 5.7 L on UF @100.    PMHx, FHx, SHx, and review of systems reviewed and otherwise unchanged.      Objective:     Visit Vitals  BP (!) 154/87   Pulse (!) 103   Temp 97.2 °F (36.2 °C) (Axillary)   Resp 18   Ht 5' 5\" (1.651 m)   Wt 97.8 kg (215 lb 9.8 oz)   SpO2 100%   BMI 35.88 kg/m²      I/O last 3 completed shifts:  In: 3287.3 [I.V.:1075.7; NG/GT:1470; IV Piggyback:741.6]  Out: 5701 [Other:5701]     Cum I/O: -12.669 L    Physical Exam:  General Appearance:  Ill-appearing, Intubated   HEENT:   Right IJ trialysis catheter in place for CRRT   Neck: Supple   Lungs:    Mechanical breath sounds bilaterally   Heart:  Normal S1 and S2, no murmurs or rub   Gastrointestinal:   Soft, non-tender, bowel sounds active   Musculoskeletal:  1+ LE edema bilaterally    Skin: No rashes or lesions   Neuro:  No focal mildly   HD Access: n/a     Lab/Data Reviewed:    CBC:   Recent Labs   Lab 03/03/22  0418 03/02/22  1254 03/02/22  0452 03/01/22  0418 02/28/22  0512   WBC 7.7  --  8.6 10.7 12.4*   RBC 2.81*  --  2.83* 2.93* 3.06*   HGB 9.1* 9.1* 9.0* 9.4* 9.8*   HCT 27.5* 27.5* 27.9* 28.6* 29.9*   MCV 97.9  --  98.6 97.6 97.7   MCHC 33.1  --  32.3 32.9 32.8   RDW-CV 15.1*  --  14.7  Report given to yoan BAIN for transfer of care. Patient alert and oriented x4/4. Able to maintain airway.    14.6 14.6     --  168 206 244   Lymphocytes, Percent 8  --  8 8 9     CMP:  Recent Labs   Lab 03/03/22  0418 03/02/22  2045 03/02/22  1254 02/28/22  1244 02/28/22  0512 02/27/22  1830 02/27/22  1100 02/27/22  0431   SODIUM 137 136 135   < > 138   < > 140 141   POTASSIUM 4.1 4.1 4.2   < > 4.3   < > 4.4 3.6   CHLORIDE 106 106 105   < > 106   < > 106 106   CO2 24 27 23   < > 26   < > 27 28   BUN 15 16 18   < > 67*   < > 80* 77*   CREATININE 1.15 1.15 1.19*   < > 2.47*   < > 2.54* 2.26*   GLUCOSE 158* 142* 156*   < > 127*   < > 161* 135*   ALBUMIN  --   --   --   --  3.1*  --  3.2* 3.2*   PHOS 3.0 1.8* 2.1*   < > 4.6   < > 5.1* 4.6   GPT  --   --   --   --  166*  --  119* 77*   ALKPT  --   --   --   --  139*  --  142* 142*   BILIRUBIN  --   --   --   --  1.2*  --  1.0 0.7   MG 2.7* 2.7* 2.7*   < > 2.4   < > 2.5* 2.1    < > = values in this interval not displayed.     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  Nephrology consulted for worsening CKD.  Patient has been aggressively diuresed with Bumex gtt and given albumin.  Over the course of the weekend, patient went into cardiac arrest and was started on CVVHDF.  Patient's family plans on palliatively extubating and undergoing comfort care tomorrow.    1.  ESRD in setting of progressively worsening CKD in setting of cardiorenal syndrome, w/ hx of +PLA2R membranous nephropathy with nephrotic syndrome  treated with Rituxan.  -Baseline creatinine 1.7-2.2.  Pr:Cr ratio 11.069g on 2/24/2022, unable to achieve clinical remission.  -Given uncontrolled volume overload and inefficacy of diuretics, patient has been on CVVHDF for past day. Continue for now while decision to withdraw care is being made.  Will decrease UF to 75 cc/hr as patient has had a very significant output in the last few days, and will increase BFR to 250 to prevent more clotting in the circuit.     2.  Acute tubular necrosis  S/p cardiac arrest, unresponsive to diuretics   -CVVHDF continued      3.  PEA Cardiac arrest, thought to be in the setting of hypoxia from mucous plugging versus dysrhythmia for electrolyte abnormalities  -Intubated, sedated     4. Anemia, at baseline   Likely secondary to CKD  -Adequate iron levels  -Will not give EPO in setting of nephrotic syndrome d/t hypercoagulability   Transfuse as needed Hgb < 7    5. Acute on Chronic Diastolic HF  -ECHO on 2/25/2022-pericardial effusion without tamponade, EF 39%, mod-severe reduced RV function   - Cardiology following, continuing amiodarone gtt and epinephrine gtt    6. Sepsis 2/2 aspiration PNA  -Antibiotics per primary team    Discussed with attending, Dr. Behara. Please see their attestation for final recommendations.     Bowen Salas, DO  IM PGY-2  [employer] to reach    Portions of this note may have been transcribed with Dragon voice recognition system. Efforts to correct errors related to improper voice recognition have been made, however irregularities may still be present. Please contact note writer for any issues or questions.     [facility]   Office Phone: [phone]  Office Fax: [phone]

## 2024-08-14 NOTE — ED QUICK NOTES
Spoke with Vaughn MOREL regarding admission. No orders for baseline blood work/labs at this time.

## 2024-08-15 LAB
ALBUMIN SERPL-MCNC: 4.5 G/DL (ref 3.2–4.8)
ALBUMIN/GLOB SERPL: 1.9 {RATIO} (ref 1–2)
ALP LIVER SERPL-CCNC: 92 U/L
ALT SERPL-CCNC: 31 U/L
ANION GAP SERPL CALC-SCNC: 7 MMOL/L (ref 0–18)
AST SERPL-CCNC: 17 U/L (ref ?–34)
BILIRUB SERPL-MCNC: 1.1 MG/DL (ref 0.3–1.2)
BUN BLD-MCNC: 14 MG/DL (ref 9–23)
CALCIUM BLD-MCNC: 9.6 MG/DL (ref 8.7–10.4)
CHLORIDE SERPL-SCNC: 106 MMOL/L (ref 98–112)
CO2 SERPL-SCNC: 26 MMOL/L (ref 21–32)
CREAT BLD-MCNC: 0.91 MG/DL
EGFRCR SERPLBLD CKD-EPI 2021: 103 ML/MIN/1.73M2 (ref 60–?)
ERYTHROCYTE [DISTWIDTH] IN BLOOD BY AUTOMATED COUNT: 13.4 %
GLOBULIN PLAS-MCNC: 2.4 G/DL (ref 2–3.5)
GLUCOSE BLD-MCNC: 153 MG/DL (ref 70–99)
HCT VFR BLD AUTO: 43.7 %
HGB BLD-MCNC: 15.6 G/DL
INR BLD: 1.02 (ref 0.8–1.2)
MAGNESIUM SERPL-MCNC: 2 MG/DL (ref 1.6–2.6)
MCH RBC QN AUTO: 29.4 PG (ref 26–34)
MCHC RBC AUTO-ENTMCNC: 35.7 G/DL (ref 31–37)
MCV RBC AUTO: 82.3 FL
OSMOLALITY SERPL CALC.SUM OF ELEC: 292 MOSM/KG (ref 275–295)
PHOSPHATE SERPL-MCNC: 3.4 MG/DL (ref 2.4–5.1)
PLATELET # BLD AUTO: 205 10(3)UL (ref 150–450)
POTASSIUM SERPL-SCNC: 4 MMOL/L (ref 3.5–5.1)
PROT SERPL-MCNC: 6.9 G/DL (ref 5.7–8.2)
PROTHROMBIN TIME: 13.5 SECONDS (ref 11.6–14.8)
RBC # BLD AUTO: 5.31 X10(6)UL
SODIUM SERPL-SCNC: 139 MMOL/L (ref 136–145)
WBC # BLD AUTO: 11.2 X10(3) UL (ref 4–11)

## 2024-08-15 PROCEDURE — 99232 SBSQ HOSP IP/OBS MODERATE 35: CPT | Performed by: HOSPITALIST

## 2024-08-15 PROCEDURE — 99233 SBSQ HOSP IP/OBS HIGH 50: CPT | Performed by: INTERNAL MEDICINE

## 2024-08-15 RX ORDER — FAMOTIDINE 20 MG/1
20 TABLET, FILM COATED ORAL 2 TIMES DAILY
Status: DISCONTINUED | OUTPATIENT
Start: 2024-08-15 | End: 2024-08-16

## 2024-08-15 NOTE — PLAN OF CARE
Assumed care at 1930.  Alert. See flowsheet for further assessment.  Room air. CPAP at night.  SR. VSS.  Clear liquid diet.  Activity as tolerated.

## 2024-08-15 NOTE — PLAN OF CARE
Assumed care of patient after RN report. Patient alert and oriented x4. On RA. SR on monitor. Swelling to face minimal, some minor lip and periorbital edema. No SOB or difficulty talking. Feels overall better. Swallowing intact. Tolerating regular diet. Denies pain. Up with assistance. Report given to SCU RN. Patient transferred via wheelchair with all belongings. See flowsheet for full assessment.

## 2024-08-15 NOTE — PAYOR COMM NOTE
--------------  ADMISSION REVIEW     Payor: [employer] CHOICE/HMO/POS/EPO  Subscriber #:  [id number]  Authorization Number: [id number]    Admit date: 8/14/24  Admit time:  5:26 PM     Patient Seen in: [facility] In [place]    History   Stated Complaint: allergy reaction started today 40 mins ago.    49-year-old gentleman.  In the last 8 months patient has had 3 total episodes of idiopathic angioedema.  Last episode 1 month prior to arrival.  In the last 90 minutes he has developed another episode.  This episode much more severe.  Moderate swelling to throat and tongue.  Periocular space.  Face.  Unsure of exposures.  Took 25 mg of Benadryl prior to arrival.    Objective:   Past Medical History:    Belching    Black stools    Post Sinus surgery    Blood in the stool    Post sinus surgery    COVID-19    cold symptoms.    Diarrhea, unspecified    When sick    Disorder of liver    FATTY LIVER    Disorder of thyroid    Esophageal reflux    Fatigue    When sick    Fever    When sick    Flatulence/gas pain/belching    Headache disorder    Heartburn    High blood pressure    Hx of motion sickness    Loss of appetite    When sick    Nausea    When sick    Night sweats    When sick    Problems with swallowing    Sleep apnea    Sleep disturbance    When sick    Thyroid disease    Vomiting    When sick     Past Surgical History:   Procedure Laterality Date    Colonoscopy  Oct 2022    Colonoscopy      Sinus surgery        Upper gi endoscopy,exam         Physical Exam     ED Triage Vitals [08/14/24 1414]   BP (!) 129/95   Pulse 79   Resp 16   Temp 98 °F (36.7 °C)   Temp src Temporal   SpO2 99 %   O2 Device None (Room air)     Current Vitals:   Vital Signs  BP: 112/70  Pulse: 80  Resp: 16  Temp: 98 °F (36.7 °C)  Temp src: Temporal    Physical Exam    Gen: Well appearing, well groomed, alert and aware x 3  Neck: Supple, full range of motion, no thyromegaly or lymphadenopathy.  Eye examination: EOMs are  intact, normal conjunctival  ENT:   Muffled voice.  Moderate swelling to tongue and posterior pharynx.  Handling oral secretions well.  Swelling to the upper and lower lips.  Swelling to the periocular space.  Lung: No distress, RR, no retraction, breath sounds are clear bilaterally  Cardio: Regular rate and rhythm, normal S1-S2, no murmur appreciable  Skin: No sign of trauma, Skin warm and dry, no induration or sign of infection.  No rash noted    MDM    Patient has been taking losartan/hydrochlorothiazide for the last 10+ years.  Potential causation.  No other new foods or medications.    Muffled voice.  Moderate swelling to tongue and posterior pharynx.  Breath sounds clear.  No systemic skin reaction.  No desquamation or bulla.  No vesicles.    IV line established.  Patient received Benadryl, Pepcid and Solu-Medrol.  Intramuscular epinephrine administered.  Patient placed on the cardiac monitor    Patient observed closely.  At least 60% improvement within the first 20 minutes.  Will continue to observe.    Case discussed with the hospitalist and will admit to the ICU for potential rebound effect.      Disposition and Plan     Clinical Impression:  1. Angioedema, initial encounter         History and Physical     History of Present Illness: Riky Nixon is a 49 year old male with PMHx Hypothyroidism, GERD, HTN, CARLOS who presents with repeat episode of tongue and throat swelling.      Patient states that he had first episode of angioedema early this year, states that he managed this at home, took Benadryl and symptoms resolved shortly.  States that earlier last month had similar episode of tongue swelling and again managed this at home with Benadryl.  Today at approximately 1:30 PM had recurrence of tingling in his lower lip, swelling of the tongue and feeling of tightness in his esophagus and thus presented to the ER for further evaluation.  Received steroids, Benadryl, famotidine, epinephrine in ER and now  states that symptoms have largely resolved, only having slight swelling and tingling of the tongue remaining.  Denies any new medications, allergies.  No family history of similar angioedema.     Physical Exam:    /82   Pulse 80   Temp 98 °F (36.7 °C) (Temporal)   Resp 17   Wt 245 lb 13 oz (111.5 kg)   SpO2 91%   BMI 33.34 kg/m²   General: No acute distress. Alert and oriented x 3.  HEENT: Normocephalic atraumatic. Moist mucous membranes. EOM-I. PERRLA. Anicteric.  Mild swelling of lower lip.  No significant tongue swelling or oropharyngeal edema.  Neck: No lymphadenopathy. No JVD. No carotid bruits.  Respiratory: Clear to auscultation bilaterally. No wheezes. No rhonchi.  Cardiovascular: S1, S2. Regular rate and rhythm. No murmurs, rubs or gallops. Equal pulses.   Chest and Back: No tenderness or deformity.  Abdomen: Soft, nontender, nondistended.  Positive bowel sounds. No rebound, guarding or organomegaly.  Neurologic: No focal neurological deficits. CNII-XII grossly intact.  Musculoskeletal: Moves all extremities.  Extremities: No edema or cyanosis.  Integument: No rashes or lesions.   Psychiatric: Appropriate mood and affect.      ASSESSMENT / PLAN:      # Angioedema   - continue IV steroids, benadryl, famotidine   - close monitoring in ICU, pulm/CC on consult   - Hold ARB at dc   - C1 esterase, C4, ESR/CRP ordered     # Eosinophilic esophagitis - continue oral budesonide  # Hypertension - Hold home oral antihypertensives  # Obstructive Sleep Apnea - CPAP at night per protocol    # GERD - continue PPI     ICU APN:    Assessment/Plan:     Angioedema vs allergic reaction  -monitor airway  -continue benadryl PRN  -hold losartan  -follow up with allergist outpatient     HTN  -hold home losartan  -IVP PRN medications     Hypothyroid  -continue home medication     F/E/N  -Clear liquid diet  -replete electrolytes as needed     Proph  -Lovenox  -SCD     Dispo  -full code  -ICU for risk of  decompensation      8/15:     HOSPITALIST:    Patient with continued facial swelling but much improved from day prior.  Denies fever, chills, n/v.  No cp or sob.  No other acute complaints.      Vital signs:  Temp:  [97.2 °F (36.2 °C)-98.1 °F (36.7 °C)] 97.4 °F (36.3 °C)  Pulse:  [60-96] 85  Resp:  [11-19] 18  BP: ()/(55-95) 127/84  SpO2:  [90 %-100 %] 96 %     Physical Exam:    General: No acute distress, pleasant   Respiratory: No wheezes, no rhonchi  Cardiovascular: S1, S2, regular rate and rhythm  Abdomen: Soft, Non-tender, non-distended, positive bowel sounds  Neuro: No new focal deficits.   Extremities: No edema     Lab 08/15/24  0454 08/15/24  0455   WBC  --  11.2*   HGB  --  15.6   MCV  --  82.3   PLT  --  205.0   INR 1.02  --       Lab 08/15/24  0455   *   BUN 14   CREATSERUM 0.91   CA 9.6   ALB 4.5      K 4.0      CO2 26.0   ALKPHO 92   AST 17   ALT 31   BILT 1.1   TP 6.9      PTP 13.5   INR 1.02     Medications:   Scheduled Medications[]Expand by Default    enoxaparin  40 mg Subcutaneous Daily    diphenhydrAMINE  50 mg Oral 4x daily    methylPREDNISolone  40 mg Intravenous Q6H    levothyroxine  200 mcg Oral Before breakfast    budesonide  1 mg Oral BID    famotidine  20 mg Intravenous BID          Assessment & Plan:  # Angioedema   - continue IV steroids, benadryl, famotidine   - Hold ARB    - C1 esterase, C4, ESR/CRP pending   - Transfer out of icu     # Eosinophilic esophagitis - continue oral budesonide  # Hypertension - Hold home oral antihypertensives  # CARLOS - CPAP at night per protocol    # GERD - continue H2B  # HLD - Synthroid       MEDICATIONS ADMINISTERED IN LAST 1 DAY:  acetaminophen (Tylenol Extra Strength) tab 1,000 mg       Date Action Dose Route User    8/15/2024 1501 Given 1,000 mg Oral Kristyn Fitzpatrick, TAYA          budesonide (Pulmicort) 0.5 MG/2ML nebulizer suspension 1 mg       Date Action Dose Route User    8/15/2024 1009 Given 1 mg Oral Kristyn Fitzpatrick, RN     8/14/2024 2050 Given 1 mg Oral Allison Holley RN          diphenhydrAMINE (Benadryl) cap/tab 50 mg       Date Action Dose Route User    8/15/2024 1313 Given 50 mg Oral Kristyn Fitzpatrick RN    8/15/2024 0822 Given 50 mg Oral Kristyn Fitzpatrick RN    8/14/2024 2049 Given 50 mg Oral Allison Holley RN          enoxaparin (Lovenox) 40 MG/0.4ML SUBQ injection 40 mg       Date Action Dose Route User    8/14/2024 1812 Given 40 mg Subcutaneous (Right Lower Abdomen) Daisha Ascencio RN          famotidine (Pepcid) 20 mg/2mL injection 20 mg       Date Action Dose Route User    8/15/2024 0822 Given 20 mg Intravenous Kristyn Fitzpatrick RN    8/14/2024 2049 Given 20 mg Intravenous Allison Holley RN     methylPREDNISolone sodium succinate (Solu-MEDROL) injection 40 mg       Date Action Dose Route User    8/15/2024 1313 Given 40 mg Intravenous Kristyn Fitzpatrick RN    8/15/2024 0822 Given 40 mg Intravenous Kristyn Fitzpatrick RN    8/15/2024 0256 Given 40 mg Intravenous Allison Holley RN    8/14/2024 2049 Given 40 mg Intravenous Allison Holley RN            Vitals (last day)       Date/Time Temp Pulse Resp BP SpO2 Weight O2 Device O2 Flow Rate (L/min) [facility]    08/15/24 1400 -- 111 17 120/67 94 % -- -- -- LD    08/15/24 1200 98.6 °F (37 °C) 105 15 130/81 95 % -- None (Room air) -- LD    08/15/24 1000 -- 98 16 120/66 93 % -- -- -- LD    08/15/24 0800 97.7 °F (36.5 °C) 91 20 127/85 94 % -- None (Room air) -- LD    08/15/24 0630 -- 85 18 127/84 96 % -- -- -- SP    08/15/24 0600 -- 76 11 103/59 90 % -- -- -- SP    08/15/24 0400 97.4 °F (36.3 °C) 60 16 102/55 92 % -- CPAP -- SP    08/15/24 0200 -- 76 12 96/63 92 % -- -- -- SP    08/15/24 0000 97.7 °F (36.5 °C) 70 13 117/74 92 % -- CPAP -- SP    08/14/24 2212 -- 86 15 -- 95 % -- CPAP -- SP    08/14/24 2200 -- 94 18 131/78 93 % -- -- -- SP    08/14/24 2020 -- 96 19 -- 92 % -- -- -- JT    08/14/24 2020 97.2 °F (36.2 °C) -- -- 133/84 -- -- None (Room air) -- SP    08/14/24 1800 -- 80 17 129/82 91 %  -- -- -- HI    08/14/24 1735 98 °F (36.7 °C) 76 15 143/80 93 % 245 lb 13 oz (111.5 kg) -- -- HI    08/14/24 1614 98.1 °F (36.7 °C) 72 18 120/70 -- -- -- -- HG    08/14/24 1556 -- 76 19 120/72 100 % -- None (Room air) -- JJ    08/14/24 1457 -- 80 16 112/70 100 % -- None (Room air) -- HG    08/14/24 1437 -- -- -- -- -- -- None (Room air) -- HG    08/14/24 1414 98 °F (36.7 °C) 79 16 129/95 99 % 245 lb (111.1 kg) None (Room air) -- RS

## 2024-08-15 NOTE — PROGRESS NOTES
Riky Nixon Patient Status:  Inpatient    1975 MRN [id number]   [facility] 4SW-A Attending Nacho Coppola MD   Hosp Day # 1 PCP Meera Zepeda MD     Critical Care Progress Note          Subjective:  No overnight events. On room air. No complaints this morning. Asking when he can go home.    Objective:    Medications:   enoxaparin  40 mg Subcutaneous Daily    diphenhydrAMINE  50 mg Oral 4x daily    methylPREDNISolone  40 mg Intravenous Q6H    levothyroxine  200 mcg Oral Before breakfast    budesonide  1 mg Oral BID    famotidine  20 mg Intravenous BID            No intake or output data in the 24 hours ending 08/15/24 0757    /84   Pulse 85   Temp 97.4 °F (36.3 °C) (Temporal)   Resp 18   Wt 245 lb 13 oz (111.5 kg)   SpO2 96%   BMI 33.34 kg/m²     Physical Exam:   General Appearance: Alert, cooperative, no distress, appears stated age  Neck: No JVD, neck supple, no adenopathy, trachea midline  Lungs: Clear to auscultation bilaterally, respirations unlabored  Heart: Regular rate and rhythm, S1 and S2 normal, no murmur, rub or gallop  Abdomen: Soft, round, non-tender, bowel sounds active all four quadrants, no masses, no organomegaly  Extremities: Extremities normal, atraumatic, no cyanosis or edema,capillary refill <3 sec.    Pulses: 2+ and symmetric all extremities  Skin: Skin color, texture, turgor normal for ethnicity, no rashes or lesions, warm and dry  Neurologic: Normal strength    Recent Labs   Lab 08/15/24  0455   RBC 5.31   HGB 15.6   HCT 43.7   MCV 82.3   MCH 29.4   MCHC 35.7   RDW 13.4   WBC 11.2*   .0     Recent Labs   Lab 08/15/24  0455   *   BUN 14   CREATSERUM 0.91   CA 9.6   ALB 4.5      K 4.0      CO2 26.0   ALKPHO 92   AST 17   ALT 31   BILT 1.1   TP 6.9     No results for input(s): \"ABGPHT\", \"NMXLLJ7D\", \"FANJD3A\", \"ABGHCO3\", \"ABGBE\", \"TEMP\", \"[place]\", \"SITE\", \"DEV\", \"THGB\" in the last 168 hours.    Invalid input(s): \"SAF80TMH\",  \"CHOB\"  No results for input(s): \"BNP\" in the last 168 hours.  No results for input(s): \"TROP\", \"CK\" in the last 168 hours.    No results found.       Assessment/Plan:    Angioedema vs allergic reaction  -Monitor airway  -Continue Benadryl, Pepcid, and steroids  -Hold losartan  -Sed rate and CRP negative, C4 and C1 esterase pending  -Follow up with allergist outpatient    Leukocytosis, likely due to steroids  -No fever  -Continue to monitor     Hx HTN  -Hold home losartan  -PRN antihypertensives  -Follow up with primary after discharge     Hx Hypothyroid  -continue home medication    Hx CARLOS  -CPAP noc     F/E/N  -Clear liquid diet, advance as tolerated  -replete electrolytes as needed     Proph  -SQ Lovenox  -SCD     Dispo  -full code  -ICU for risk of decompensation    Plan of care discussed with intensivist on-call, Dr. Jovel.    Jacqui Cook, [facility]    Pulmonary/Critical Care Physician Addendum     Riky Nixon was interviewed and examined personally.  I reviewed and agree with the APN's documentation above of the patient's history, physical examination, test results, diagnoses, and plan of treatment.      Labs and imaging reviewed.     ASSESSMENT/PLAN  Feels better and denies any respiratory complaints or difficulty swallowing. Has some numbness on lips but no swelling.   Continue angioedema meds.   F/u on labs.   Ok for transfer out of ICU later today. Advance diet.   Should see an allergist as inpt or close outpt f/u.    Stable for transfer out of ICU. Pulmonary/CCM service will sign off upon physical transfer out of the ICU. Contact our service with questions/concerns.       Thank you for the opportunity to care for Riky Nixon.      MAY Jovel DO, MPH  Pulmonary Critical Care Medicine  [facility]

## 2024-08-15 NOTE — PROGRESS NOTES
[facility]   part of [facility]     Hospitalist Progress Note     Riky Jarod Mckinneyadman Patient Status:  Inpatient    1975 MRN [id number]   Location [facility] 4SW-A Attending Nacho Coppola MD   Hosp Day # 1 PCP Meera Zepeda MD     Chief Complaint: Facial swelling     Subjective:     Patient with continued facial swelling but much improved from day prior.  Denies fever, chills, n/v.  No cp or sob.  No other acute complaints.      Objective:    Review of Systems:   A comprehensive review of systems was completed; pertinent positive and negatives stated in subjective.    Vital signs:  Temp:  [97.2 °F (36.2 °C)-98.1 °F (36.7 °C)] 97.4 °F (36.3 °C)  Pulse:  [60-96] 85  Resp:  [11-19] 18  BP: ()/(55-95) 127/84  SpO2:  [90 %-100 %] 96 %    Physical Exam:    General: No acute distress, pleasant   Respiratory: No wheezes, no rhonchi  Cardiovascular: S1, S2, regular rate and rhythm  Abdomen: Soft, Non-tender, non-distended, positive bowel sounds  Neuro: No new focal deficits.   Extremities: No edema    Diagnostic Data:    Labs:  Recent Labs   Lab 08/15/24  0454 08/15/24  0455   WBC  --  11.2*   HGB  --  15.6   MCV  --  82.3   PLT  --  205.0   INR 1.02  --        Recent Labs   Lab 08/15/24  0455   *   BUN 14   CREATSERUM 0.91   CA 9.6   ALB 4.5      K 4.0      CO2 26.0   ALKPHO 92   AST 17   ALT 31   BILT 1.1   TP 6.9       Estimated Creatinine Clearance: 107.8 mL/min (based on SCr of 0.91 mg/dL).    No results for input(s): \"TROP\", \"TROPHS\", \"CK\" in the last 168 hours.    Recent Labs   Lab 08/15/24  0454   PTP 13.5   INR 1.02                  Microbiology    No results found for this visit on 24.      Imaging: Reviewed in Epic.    Medications:    enoxaparin  40 mg Subcutaneous Daily    diphenhydrAMINE  50 mg Oral 4x daily    methylPREDNISolone  40 mg Intravenous Q6H    levothyroxine  200 mcg Oral Before breakfast    budesonide  1 mg Oral BID    famotidine  20 mg Intravenous  BID       Assessment & Plan:      # Angioedema   - continue IV steroids, benadryl, famotidine   - Hold ARB    - C1 esterase, C4, ESR/CRP pending   - Transfer out of icu     # Eosinophilic esophagitis - continue oral budesonide  # Hypertension - Hold home oral antihypertensives  # CARLOS - CPAP at night per protocol    # GERD - continue H2B  # HLD - Synthroid       Nacho Coppola MD    Supplementary Documentation:     Quality:  DVT Mechanical Prophylaxis:   SCDs, Early ambuation  DVT Pharmacologic Prophylaxis   Medication    enoxaparin (Lovenox) 40 MG/0.4ML SUBQ injection 40 mg                Code Status: Full Code  Naylor: No urinary catheter in place  Naylor Duration (in days):   Central line:    RUSLAN:     Discharge is dependent on: Swelling  At this point Mr. Nixon is expected to be discharge to: Home    The 21st Century Cures Act makes medical notes like these available to patients in the interest of transparency. Please be advised this is a medical document. Medical documents are intended to carry relevant information, facts as evident, and the clinical opinion of the practitioner. The medical note is intended as peer to peer communication and may appear blunt or direct. It is written in medical language and may contain abbreviations or verbiage that are unfamiliar.

## 2024-08-15 NOTE — SLP NOTE
ADULT SWALLOWING EVALUATION    ASSESSMENT    ASSESSMENT/OVERALL IMPRESSION:  Patient is a 50 y/o male admitted with angioedema and PMHx significant for GERD, HTN, and CARLOS. SLP order received to evaluate oropharyngeal swallow. Patient received alert and oriented in bed. He denied history of dysphagia symptoms and reported consuming a regular diet and thin liquids at baseline.    Patient presented with an intact oropharyngeal swallow. Bolus acceptance was adequate without evidence of anterior bolus loss. Mastication and AP bolus transit were thorough and efficient without evidence of oral residue. Pharyngeal swallow initiation appeared timely and hyolaryngeal excursion was adequate per palpation.  No overt s/s of aspiration observed and patient denied odynophagia and globus sensation across consistencies.     Recommend patient continue a regular diet and thin liquids. No further SLP services warranted at this time as no deficits identified which require skilled intervention. Education provided re: results and recommendations.         RECOMMENDATIONS   Diet Recommendations - Solids: Regular  Diet Recommendations - Liquids: Thin Liquids                              Medication Administration Recommendations: No restrictions  Treatment Plan/Recommendations: No further inpatient SLP service warranted    HISTORY   MEDICAL HISTORY  Reason for Referral: R/O aspiration    Problem List  Principal Problem:    Angioedema, initial encounter  Active Problems:    Primary hypertension    Gastroesophageal reflux disease with esophagitis without hemorrhage    Esophagitis      Past Medical History  Past Medical History:    COVID-19    cold symptoms.    Disorder of liver    FATTY LIVER    Disorder of thyroid    Esophageal reflux    Flatulence/gas pain/belching    Headache disorder    Heartburn    High blood pressure    Hx of motion sickness    Problems with swallowing    Sleep apnea    Thyroid disease          Diet Prior to Admission:  Regular;Thin liquids       Patient/Family Goals: none stated    SWALLOWING HISTORY  Current Diet Consistency: Regular;Thin liquids  Dysphagia History: as above  Imaging Results: NA    SUBJECTIVE       OBJECTIVE   ORAL MOTOR EXAMINATION  Dentition: Functional  Symmetry: Within Functional Limits  Strength: Within Functional Limits     Range of Motion: Within Functional Limits       Voice Quality: Clear  Respiratory Status: Unlabored  Consistencies Trialed: Thin liquids;Hard solid  Method of Presentation: Self presentation  Patient Positioning: Upright;Midline    Oral Phase of Swallow: Within Functional Limits                      Pharyngeal Phase of Swallow: Within Functional Limits           (Please note: Silent aspiration cannot be evaluated clinically. Videofluoroscopic Swallow Study is required to rule-out silent aspiration.)    Esophageal Phase of Swallow: No complaints consistent with possible esophageal involvement  Comments: d/w RN                  FOLLOW UP  Treatment Plan/Recommendations: No further inpatient SLP service warranted     Follow Up Needed (Documentation Required): No       Thank you for your referral.   If you have any questions, please contact LEONARD Guerrero

## 2024-08-16 VITALS
HEART RATE: 57 BPM | SYSTOLIC BLOOD PRESSURE: 109 MMHG | RESPIRATION RATE: 18 BRPM | TEMPERATURE: 98 F | DIASTOLIC BLOOD PRESSURE: 59 MMHG | WEIGHT: 245.81 LBS | BODY MASS INDEX: 33 KG/M2 | OXYGEN SATURATION: 94 %

## 2024-08-16 PROCEDURE — 99239 HOSP IP/OBS DSCHRG MGMT >30: CPT | Performed by: HOSPITALIST

## 2024-08-16 NOTE — DISCHARGE INSTRUCTIONS
Call or go to ER for any return/worsening symptoms: Swelling to face, throat, tongue, or lips, difficulty breathing, difficulty swallowing or for any questions/concerns.

## 2024-08-16 NOTE — PLAN OF CARE
Pt reports doing well, mild agio edema noted. Pt denies any difficulty breathing, on ra. Poc updated, pt verbalized understanding.

## 2024-08-16 NOTE — PROGRESS NOTES
A&Ox4. VSS. RA. . Denies chest pain or shortness of breath. Swelling to face minimal. No trouble swallowing.  GI: Abdomen soft, nondistended. Passing gas.  Denies nausea.  : Voids.  Denies pain.  Up Independent.  Diet: Tolerating regular diet.  IVF S.L.  All appropriate safety measures in place. All questions and concerns addressed.

## 2024-08-16 NOTE — PROGRESS NOTES
The patient is alert and oriented x 4, and his vital signs are stable. He is tolerating his diet and states that he has no difficulty with swallowing.

## 2024-08-16 NOTE — DISCHARGE SUMMARY
[place] HOSPITALIST  DISCHARGE SUMMARY     Riky Nixon Patient Status:  Inpatient    1975 MRN [id number]   Location [facility] 3NW-A Attending No att. providers found   Hosp Day # 2 PCP Meera Zepeda MD     Date of Admission: 2024  Date of Discharge:  2024     Discharge Disposition: Home or Self Care    Discharge Diagnosis:    # Angioedema  # Eosinophilic esophagitis    # Hypertension    # CARLOS   # GERD    # HLD    History of Present Illness: Riky Nixon is a 49 year old male with PMHx Hypothyroidism, GERD, HTN, CARLOS who presents with repeat episode of tongue and throat swelling.      Patient states that he had first episode of angioedema early this year, states that he managed this at home, took Benadryl and symptoms resolved shortly.  States that earlier last month had similar episode of tongue swelling and again managed this at home with Benadryl.  Today at approximately 1:30 PM had recurrence of tingling in his lower lip, swelling of the tongue and feeling of tightness in his esophagus and thus presented to the ER for further evaluation.  Received steroids, Benadryl, famotidine, epinephrine in ER and now states that symptoms have largely resolved, only having slight swelling and tingling of the tongue remaining.  Denies any new medications, allergies.  No family history of similar angioedema.    Brief Synopsis:   Patient admitted for angioedema.  Has had multiple episodes over past year.  Symptoms improved with steroids, H2B and benadryl.  No airway compromise.  Patient has outpatient f/u with allergist and pcp both on Monday.  C1 esterase pending on discharge.  Stop losartan, however likely not the cause.  All questions addressed prior to discharge, patient agreeable to plan.  He is encouraged to return to the ER if symptoms come back/worsen.      Lace+ Score: 59  59-90 High Risk  29-58 Medium Risk  0-28   Low Risk  Patient was referred to the [facility].    TCM Follow-Up Recommendation:  LACE > 58: High Risk of readmission after discharge from the hospital.      Procedures during hospitalization:   None    Consultants:  Pulm    Discharge Medication List:     Discharge Medications        CONTINUE taking these medications        Instructions Prescription details   Budesonide 1 MG/2ML Susp      1 mg/2 ml  po twice daily: Mix 5 packets of Splenda or mix with honey as slurry, swallow twice daily. Avoid eating or drinking x 1 hour after each use. Gargle water  for 30 secs at the back of the throat then spit after each use.   Quantity: 180 each  Refills: 3     levothyroxine 200 MCG Tabs  Commonly known as: Synthroid      TAKE 1 TABLET BY MOUTH BEFORE BREAKFAST   Quantity: 90 tablet  Refills: 0     omeprazole 20 MG Cpdr  Commonly known as: PriLOSEC      Take one capsule (20 mg total) by mouth once daily, 30 minutes prior to breakfast.   Quantity: 90 capsule  Refills: 3     triamcinolone 55 MCG/ACT Aero  Commonly known as: Nasacort      1 spray by Nasal route daily as needed.   Refills: 0            STOP taking these medications      losartan-hydroCHLOROthiazide 50-12.5 MG Tabs  Commonly known as: Hyzaar                 [place]    Follow-up appointment:   Meera Zepeda MD  [address] [address]  [phone]    Follow up in 1 week(s)      Appointments for Next 30 Days 8/16/2024 - 9/15/2024        Date Arrival Time Visit Type Length Department Provider     8/19/2024  3:30 PM  MYCHART EXAM [1854] 30 min [facility], [facility] Meera Zepeda MD    Patient Instructions:     Please arrive 15 minutes prior to your scheduled appointment. Please also bring your Insurance card, Photo ID, and your medication bottles or a list of your current medication.    If your condition improves and this appointment is no longer needed, please contact your physician office to cancel.    Please verify with your primary care  provider if your insurance requires a referral.        Location Instructions:     Masks are optional for all patients and visitors, unless otherwise indicated.                      Vital signs:  Temp:  [97.6 °F (36.4 °C)-98.5 °F (36.9 °C)] 97.6 °F (36.4 °C)  Pulse:  [57-79] 57  Resp:  [14-18] 18  BP: (109-120)/(57-64) 109/59  SpO2:  [94 %-96 %] 94 %    Physical Exam:    General: No acute distress, pleasant    Lungs: clear to auscultation  Cardiovascular: S1, S2, RRR  Abdomen: Soft, NT, ND    -----------------------------------------------------------------------------------------------  PATIENT DISCHARGE INSTRUCTIONS: See electronic chart    Nacho Coppola MD    Total time spent on discharge plannin minutes     The  [employer] Cures Act makes medical notes like these available to patients in the interest of transparency. Please be advised this is a medical document. Medical documents are intended to carry relevant information, facts as evident, and the clinical opinion of the practitioner. The medical note is intended as peer to peer communication and may appear blunt or direct. It is written in medical language and may contain abbreviations or verbiage that are unfamiliar.

## 2024-08-16 NOTE — PLAN OF CARE
Problem: PAIN - ADULT  Goal: Verbalizes/displays adequate comfort level or patient's stated pain goal  Description: INTERVENTIONS:  - Encourage pt to monitor pain and request assistance  - Assess pain using appropriate pain scale  - Administer analgesics based on type and severity of pain and evaluate response  - Implement non-pharmacological measures as appropriate and evaluate response  - Consider cultural and social influences on pain and pain management  - Manage/alleviate anxiety  - Utilize distraction and/or relaxation techniques  - Monitor for opioid side effects  - Notify MD/LIP if interventions unsuccessful or patient reports new pain  - Anticipate increased pain with activity and pre-medicate as appropriate  Outcome: Progressing     Problem: RISK FOR INFECTION - ADULT  Goal: Absence of fever/infection during anticipated neutropenic period  Description: INTERVENTIONS  - Monitor WBC  - Administer growth factors as ordered  - Implement neutropenic guidelines  Outcome: Progressing     Problem: SAFETY ADULT - FALL  Goal: Free from fall injury  Description: INTERVENTIONS:  - Assess pt frequently for physical needs  - Identify cognitive and physical deficits and behaviors that affect risk of falls.  - [place] fall precautions as indicated by assessment.  - Educate pt/family on patient safety including physical limitations  - Instruct pt to call for assistance with activity based on assessment  - Modify environment to reduce risk of injury  - Provide assistive devices as appropriate  - Consider OT/PT consult to assist with strengthening/mobility  - Encourage toileting schedule  Outcome: Progressing     Problem: DISCHARGE PLANNING  Goal: Discharge to home or other facility with appropriate resources  Description: INTERVENTIONS:  - Identify barriers to discharge w/pt and caregiver  - Include patient/family/discharge partner in discharge planning  - Arrange for needed discharge resources and transportation as  appropriate  - Identify discharge learning needs (meds, wound care, etc)  - Arrange for interpreters to assist at discharge as needed  - Consider post-discharge preferences of patient/family/discharge partner  - Complete POLST form as appropriate  - Assess patient's ability to be responsible for managing their own health  - Refer to Case Management Department for coordinating discharge planning if the patient needs post-hospital services based on physician/LIP order or complex needs related to functional status, cognitive ability or social support system  Outcome: Progressing     Problem: Altered Communication/Language Barrier  Goal: Patient/Family is able to understand and participate in their care  Description: Interventions:  - Assess communication ability and preferred communication style  - Implement communication aides and strategies  - Use visual cues when possible  - Listen attentively, be patient, do not interrupt  - Minimize distractions  - Allow time for understanding and response  - Establish method for patient to ask for assistance (call light)  - Provide an  as needed  - Communicate barriers and strategies to overcome with those who interact with patient  Outcome: Progressing

## 2024-08-19 ENCOUNTER — PATIENT OUTREACH (OUTPATIENT)
Dept: CASE MANAGEMENT | Age: 49
End: 2024-08-19

## 2024-08-19 ENCOUNTER — OFFICE VISIT (OUTPATIENT)
Dept: INTERNAL MEDICINE CLINIC | Facility: CLINIC | Age: 49
End: 2024-08-19
Payer: COMMERCIAL

## 2024-08-19 VITALS
DIASTOLIC BLOOD PRESSURE: 76 MMHG | BODY MASS INDEX: 34.16 KG/M2 | RESPIRATION RATE: 18 BRPM | HEIGHT: 72 IN | OXYGEN SATURATION: 99 % | SYSTOLIC BLOOD PRESSURE: 132 MMHG | TEMPERATURE: 98 F | HEART RATE: 68 BPM | WEIGHT: 252.19 LBS

## 2024-08-19 DIAGNOSIS — I10 PRIMARY HYPERTENSION: Primary | ICD-10-CM

## 2024-08-19 DIAGNOSIS — Z02.9 ENCOUNTERS FOR UNSPECIFIED ADMINISTRATIVE PURPOSE: Primary | ICD-10-CM

## 2024-08-19 DIAGNOSIS — T78.3XXD ANGIOEDEMA, SUBSEQUENT ENCOUNTER: ICD-10-CM

## 2024-08-19 LAB
C1 EST INHIB FUNC: 106 %MEAN NORMAL
C1 ESTERASE INHIB: 32 MG/DL
C4 COMPLEMENT: 48 MG/DL

## 2024-08-19 RX ORDER — AMLODIPINE BESYLATE 5 MG/1
5 TABLET ORAL DAILY
Qty: 90 TABLET | Refills: 0 | Status: SHIPPED | OUTPATIENT
Start: 2024-08-19

## 2024-08-19 NOTE — PROGRESS NOTES
Subjective:   Riky Nixon is a 49 year old male who presents for hospital follow up.   He was discharged from [facility] to Home or Self Care  Admission Date: 8/14/24   Discharge Date: 8/16/24  Hospital Discharge Diagnosis: angioedema    Interactive contact within 2 business days post discharge first initiated on Date: 8/19/2024    During the visit, the following was completed:  Obtained and reviewed discharge summary, continuity of care documents, and Hospitalist notes  Reviewed  epic notes    HPI: Date of Admission: 8/14/2024  Date of Discharge:  8/16/2024      Discharge Disposition: Home or Self Care     Discharge Diagnosis:     # Angioedema  # Eosinophilic esophagitis    # Hypertension    # CARLOS   # GERD    # HLD     History of Present Illness: Riky Nixon is a 49 year old male with PMHx Hypothyroidism, GERD, HTN, CARLOS who presents with repeat episode of tongue and throat swelling.      Patient states that he had first episode of angioedema early this year, states that he managed this at home, took Benadryl and symptoms resolved shortly.  States that earlier last month had similar episode of tongue swelling and again managed this at home with Benadryl.  Today at approximately 1:30 PM had recurrence of tingling in his lower lip, swelling of the tongue and feeling of tightness in his esophagus and thus presented to the ER for further evaluation.  Received steroids, Benadryl, famotidine, epinephrine in ER and now states that symptoms have largely resolved, only having slight swelling and tingling of the tongue remaining.  Denies any new medications, allergies.  No family history of similar angioedema.     Brief Synopsis:   Patient admitted for angioedema.  Has had multiple episodes over past year.  Symptoms improved with steroids, H2B and benadryl.  No airway compromise.  Patient has outpatient f/u with allergist and pcp both on Monday.  C1 esterase pending on discharge.  Stop losartan, however likely  not the cause.  All questions addressed prior to discharge, patient agreeable to plan.  He is encouraged to return to the ER if symptoms come back/worsen.          He is here for f/u     Did see allergist today     he felt maybe the ibuprofen was the culprit   but could not rule out the losartan    felt not a food allergy     Nevertheless the med has been Dc'd     The swelling has resolved     Breathing was never an issue nor swllowing    Has been cking the BP and getting good #s   120s/70-80  range        History/Other:   Current Medications:  Medication Reconciliation:  I am aware of an inpatient discharge within the last 30 days.  The discharge medication list has been reconciled with the patient's current medication list and reviewed by me.  See medication list for additions of new medication, and changes to current doses of medications and discontinued medications.  Outpatient Medications Marked as Taking for the 8/19/24 encounter (Office Visit) with Meera Zepeda MD   Medication Sig    amLODIPine (NORVASC) 5 MG Oral Tab Take 1 tablet (5 mg total) by mouth daily.    LEVOTHYROXINE 200 MCG Oral Tab TAKE 1 TABLET BY MOUTH BEFORE BREAKFAST    Budesonide 1 MG/2ML Inhalation Suspension 1 mg/2 ml  po twice daily: Mix 5 packets of Splenda or mix with honey as slurry, swallow twice daily. Avoid eating or drinking x 1 hour after each use. Gargle water  for 30 secs at the back of the throat then spit after each use.    omeprazole 20 MG Oral Capsule Delayed Release Take one capsule (20 mg total) by mouth once daily, 30 minutes prior to breakfast.    triamcinolone 55 MCG/ACT Nasal Aerosol 1 spray by Nasal route daily as needed.       Review of Systems:  GENERAL: as above      Objective:   No LMP for male patient.  Estimated body mass index is 34.2 kg/m² as calculated from the following:    Height as of this encounter: 6' (1.829 m).    Weight as of this encounter: 252 lb 3.2 oz (114.4 kg).   /76 (BP Location: Right  arm, Patient Position: Sitting, Cuff Size: adult)   Pulse 68   Temp 97.5 °F (36.4 °C) (Temporal)   Resp 18   Ht 6' (1.829 m)   Wt 252 lb 3.2 oz (114.4 kg)   SpO2 99%   BMI 34.20 kg/m²    GENERAL: well developed, well nourished, in no apparent distress  SKIN: no rashes  HEENT: atraumatic, normocephalic, ears and throat are clear  NECK: supple, no adenopathy   LUNGS: clear to auscultation  CARDIO: RRR without murmur  EXTREMITIES: no edema       Assessment & Plan:   1. Primary hypertension  Losartanhct stopped     BP OK still but I suspect will creep up     discussed norvasc   started at 5mg    call w update on BP in 1 week    Proceed then        2. Angioedema, subsequent encounter  Discussed his  stay        losartan stopped        1. Primary hypertension (Primary)  2. Angioedema, subsequent encounter  Other orders  -     amLODIPine Besylate; Take 1 tablet (5 mg total) by mouth daily.  Dispense: 90 tablet; Refill: 0        No follow-ups on file.

## 2024-08-28 ENCOUNTER — OFFICE VISIT (OUTPATIENT)
Dept: INTERNAL MEDICINE CLINIC | Facility: CLINIC | Age: 49
End: 2024-08-28
Payer: COMMERCIAL

## 2024-08-28 VITALS
HEIGHT: 72 IN | SYSTOLIC BLOOD PRESSURE: 120 MMHG | DIASTOLIC BLOOD PRESSURE: 70 MMHG | BODY MASS INDEX: 33.48 KG/M2 | HEART RATE: 93 BPM | OXYGEN SATURATION: 97 % | TEMPERATURE: 98 F | WEIGHT: 247.19 LBS

## 2024-08-28 DIAGNOSIS — I10 ESSENTIAL HYPERTENSION: Primary | ICD-10-CM

## 2024-08-28 PROCEDURE — 99213 OFFICE O/P EST LOW 20 MIN: CPT | Performed by: FAMILY MEDICINE

## 2024-08-28 NOTE — PROGRESS NOTES
Riky Nixon is a 49 year old male.     HPI:   Patient presents for recheck of his hypertension. Pt has been taking medications as instructed, no medication side effects, home BP monitoring in the range of 120-30's systolic and 70's diastolic.       Wt Readings from Last 6 Encounters:   08/28/24 247 lb 3.2 oz (112.1 kg)   08/19/24 252 lb 3.2 oz (114.4 kg)   08/14/24 245 lb 13 oz (111.5 kg)   07/15/24 250 lb (113.4 kg)   06/06/24 250 lb (113.4 kg)   05/03/24 250 lb 9.6 oz (113.7 kg)     Body mass index is 33.53 kg/m².    CHOLESTEROL, TOTAL (mg/dL)   Date Value   02/27/2024 155   02/27/2023 186   02/10/2022 191     HDL CHOLESTEROL (mg/dL)   Date Value   02/27/2024 31 (L)   02/27/2023 35 (L)   02/10/2022 38 (L)     LDL-CHOLESTEROL (mg/dL (calc))   Date Value   02/27/2024 96   02/27/2023 119 (H)   02/10/2022 121 (H)     AST (U/L)   Date Value   08/15/2024 17   02/27/2024 39   02/27/2023 51 (H)   12/02/2022 55 (H)     ALT (U/L)   Date Value   08/15/2024 31   02/27/2024 65 (H)   02/27/2023 96 (H)   12/02/2022 98 (H)       Current Outpatient Medications   Medication Sig Dispense Refill    amLODIPine (NORVASC) 5 MG Oral Tab Take 1 tablet (5 mg total) by mouth daily. 90 tablet 0    LEVOTHYROXINE 200 MCG Oral Tab TAKE 1 TABLET BY MOUTH BEFORE BREAKFAST 90 tablet 0    Budesonide 1 MG/2ML Inhalation Suspension 1 mg/2 ml  po twice daily: Mix 5 packets of Splenda or mix with honey as slurry, swallow twice daily. Avoid eating or drinking x 1 hour after each use. Gargle water  for 30 secs at the back of the throat then spit after each use. 180 each 3    omeprazole 20 MG Oral Capsule Delayed Release Take one capsule (20 mg total) by mouth once daily, 30 minutes prior to breakfast. 90 capsule 3    triamcinolone 55 MCG/ACT Nasal Aerosol 1 spray by Nasal route daily as needed.        Past Medical History:    COVID-19    cold symptoms.    Disorder of liver    FATTY LIVER    Disorder of thyroid    Esophageal reflux     Flatulence/gas pain/belching    Headache disorder    Heartburn    High blood pressure    Hx of motion sickness    Problems with swallowing    Sleep apnea    Thyroid disease      Past Surgical History:   Procedure Laterality Date    Colonoscopy  Oct 2022    Colonoscopy      Sinus surgery        Upper gi endoscopy,exam        Social History:    Social History     Socioeconomic History    Marital status:    Tobacco Use    Smoking status: Never    Smokeless tobacco: Never   Vaping Use    Vaping status: Never Used   Substance and Sexual Activity    Alcohol use: No    Drug use: No   Other Topics Concern    Caffeine Concern Yes     Comment: 2-3 cans of Diet Coke daily.    Stress Concern Yes    Weight Concern Yes    Special Diet No    Exercise No    Seat Belt Yes     Social Determinants of Health     Financial Resource Strain: Low Risk  (7/15/2024)    Received from [facility]    Overall Financial Resource Strain (CARDIA)     Difficulty of Paying Living Expenses: Not hard at all   Food Insecurity: No Food Insecurity (8/14/2024)    Food Insecurity     Food Insecurity: Never true   Transportation Needs: No Transportation Needs (8/14/2024)    Transportation Needs     Lack of Transportation: No   Housing Stability: Low Risk  (8/14/2024)    Housing Stability     Housing Instability: No         REVIEW OF SYSTEMS:   GENERAL HEALTH: feels well otherwise  RESPIRATORY: denies shortness of breath with exertion  CARDIOVASCULAR: denies chest pain on exertion  NEURO: denies headaches    EXAM:   /70   Pulse 93   Temp 97.8 °F (36.6 °C) (Temporal)   Ht 6' (1.829 m)   Wt 247 lb 3.2 oz (112.1 kg)   SpO2 97%   BMI 33.53 kg/m²   GENERAL: well developed, well nourished,in no apparent distress  NECK: supple,no adenopathy   LUNGS: clear to auscultation  CARDIO: RRR without murmur  EXTREMITIES: no  edema    ASSESSMENT AND PLAN:   Pt presents for a recheck of his hypertension. BP is well controlled, no  significant medication side effects noted.  PLAN: will continue present medications. The patient indicates understanding of these issues and agrees to the plan.  The patient is asked to return in Feb    Letter written for the [employer]  .

## 2024-11-05 DIAGNOSIS — E03.9 HYPOTHYROIDISM, UNSPECIFIED TYPE: ICD-10-CM

## 2024-11-05 RX ORDER — AMLODIPINE BESYLATE 5 MG/1
5 TABLET ORAL DAILY
Qty: 90 TABLET | Refills: 0 | Status: SHIPPED | OUTPATIENT
Start: 2024-11-05

## 2024-11-05 RX ORDER — LEVOTHYROXINE SODIUM 200 UG/1
200 TABLET ORAL
Qty: 90 TABLET | Refills: 0 | Status: SHIPPED | OUTPATIENT
Start: 2024-11-05

## 2024-11-05 NOTE — TELEPHONE ENCOUNTER
Levothyroxine 200 mcg  Filled 7-2-24   Qty 90  0 refills  No future appointments.  LOV 2-29-24 TO  Labs 2-27-24 TSH and Free T4

## 2025-01-03 ENCOUNTER — OFFICE VISIT (OUTPATIENT)
Dept: INTERNAL MEDICINE CLINIC | Facility: CLINIC | Age: 50
End: 2025-01-03
Payer: COMMERCIAL

## 2025-01-03 ENCOUNTER — HOSPITAL ENCOUNTER (OUTPATIENT)
Dept: GENERAL RADIOLOGY | Age: 50
Discharge: HOME | End: 2025-01-03
Attending: FAMILY MEDICINE
Payer: COMMERCIAL

## 2025-01-03 VITALS
WEIGHT: 246.38 LBS | BODY MASS INDEX: 33 KG/M2 | SYSTOLIC BLOOD PRESSURE: 138 MMHG | HEART RATE: 95 BPM | TEMPERATURE: 98 F | OXYGEN SATURATION: 97 % | DIASTOLIC BLOOD PRESSURE: 72 MMHG

## 2025-01-03 DIAGNOSIS — M25.521 ELBOW PAIN, RIGHT: Primary | ICD-10-CM

## 2025-01-03 DIAGNOSIS — I10 PRIMARY HYPERTENSION: ICD-10-CM

## 2025-01-03 DIAGNOSIS — M25.521 ELBOW PAIN, RIGHT: ICD-10-CM

## 2025-01-03 PROCEDURE — 99213 OFFICE O/P EST LOW 20 MIN: CPT | Performed by: FAMILY MEDICINE

## 2025-01-03 PROCEDURE — 73080 X-RAY EXAM OF ELBOW: CPT | Performed by: FAMILY MEDICINE

## 2025-01-03 RX ORDER — AMLODIPINE BESYLATE 10 MG/1
10 TABLET ORAL DAILY
Qty: 90 TABLET | Refills: 0 | Status: SHIPPED | OUTPATIENT
Start: 2025-01-03

## 2025-01-03 RX ORDER — PREDNISONE 10 MG/1
TABLET ORAL
Qty: 20 TABLET | Refills: 0 | Status: SHIPPED | OUTPATIENT
Start: 2025-01-03

## 2025-01-03 NOTE — PROGRESS NOTES
Riky Nixon is a 49 year old male.  HPI:   Late Nov hit the R elbow on door frame in a hotel  and has hurt since       more comfortable w bending    Has tried to use it less    tylenol wo relief     can get some  issues w the 4,5 fingers        may have swelled      BP running higher lately as well     on norvasc 5  daily        Current Outpatient Medications   Medication Sig Dispense Refill    AMLODIPINE 5 MG Oral Tab TAKE 1 TABLET BY MOUTH EVERY DAY 90 tablet 0    LEVOTHYROXINE 200 MCG Oral Tab TAKE 1 TABLET BY MOUTH BEFORE BREAKFAST 90 tablet 0    Budesonide 1 MG/2ML Inhalation Suspension 1 mg/2 ml  po twice daily: Mix 5 packets of Splenda or mix with honey as slurry, swallow twice daily. Avoid eating or drinking x 1 hour after each use. Gargle water  for 30 secs at the back of the throat then spit after each use. 180 each 3    omeprazole 20 MG Oral Capsule Delayed Release Take one capsule (20 mg total) by mouth once daily, 30 minutes prior to breakfast. 90 capsule 3    triamcinolone 55 MCG/ACT Nasal Aerosol 1 spray by Nasal route daily as needed.        Past Medical History:    COVID-19    cold symptoms.    Disorder of liver    FATTY LIVER    Disorder of thyroid    Esophageal reflux    Flatulence/gas pain/belching    Headache disorder    Heartburn    High blood pressure    Hx of motion sickness    Problems with swallowing    Sleep apnea    Thyroid disease      Social History:  Social History     Socioeconomic History    Marital status:    Tobacco Use    Smoking status: Never    Smokeless tobacco: Never   Vaping Use    Vaping status: Never Used   Substance and Sexual Activity    Alcohol use: No    Drug use: No   Other Topics Concern    Caffeine Concern Yes     Comment: 2-3 cans of Diet Coke daily.    Stress Concern Yes    Weight Concern Yes    Special Diet No    Exercise No    Seat Belt Yes     Social Drivers of Health     Financial Resource Strain: Low Risk  (7/15/2024)    Received from [facility]    Overall Financial Resource Strain (CARDIA)     Difficulty of Paying Living Expenses: Not hard at all   Food Insecurity: No Food Insecurity (8/14/2024)    Food Insecurity     Food Insecurity: Never true   Transportation Needs: No Transportation Needs (8/14/2024)    Transportation Needs     Lack of Transportation: No   Housing Stability: Low Risk  (8/14/2024)    Housing Stability     Housing Instability: No        REVIEW OF SYSTEMS:   GENERAL HEALTH: feels well otherwise    EXAM:   /72 (BP Location: Left arm, Patient Position: Sitting, Cuff Size: adult)   Pulse 95   Temp 97.9 °F (36.6 °C) (Temporal)   Wt 246 lb 6.4 oz (111.8 kg)   SpO2 97%   BMI 33.42 kg/m²    145/80  GENERAL: well developed, well nourished,in no apparent distress  R elbow is slightly swollen R lateral aspect    some pain w pronation       ASSESSMENT AND PLAN:   1. Elbow pain, right  Will check xray   pred taper prdered   Proceed then      - XR ELBOW, COMPLETE (MIN 3 VIEWS), RIGHT (CPT=73080); Future    2. Primary hypertension  Increase norvasc to 10mg dose  daily    gisell 2 mo w px        The patient indicates understanding of these issues and agrees to the plan.  .

## 2025-01-23 ENCOUNTER — PATIENT MESSAGE (OUTPATIENT)
Dept: INTERNAL MEDICINE CLINIC | Facility: CLINIC | Age: 50
End: 2025-01-23

## 2025-01-23 DIAGNOSIS — M25.521 ELBOW PAIN, RIGHT: Primary | ICD-10-CM

## 2025-01-23 NOTE — TELEPHONE ENCOUNTER
Please se update on patient's symptoms after finishing course of steroids. Patient feels pain with certain maneuvers.

## 2025-02-01 DIAGNOSIS — E03.9 HYPOTHYROIDISM, UNSPECIFIED TYPE: ICD-10-CM

## 2025-02-02 RX ORDER — LEVOTHYROXINE SODIUM 200 UG/1
200 TABLET ORAL
Qty: 90 TABLET | Refills: 0 | Status: SHIPPED | OUTPATIENT
Start: 2025-02-02

## 2025-02-05 ENCOUNTER — TELEPHONE (OUTPATIENT)
Facility: CLINIC | Age: 50
End: 2025-02-05

## 2025-02-05 NOTE — TELEPHONE ENCOUNTER
New pt appt for Referral for rt elbow pain imaging avail in epic .  Future Appointments  3/7/2025   2:30 PM    Alen Vences MD          [id number]

## 2025-02-19 ENCOUNTER — PATIENT MESSAGE (OUTPATIENT)
Dept: INTERNAL MEDICINE CLINIC | Facility: CLINIC | Age: 50
End: 2025-02-19

## 2025-02-19 DIAGNOSIS — Z00.00 ENCOUNTER FOR WELLNESS EXAMINATION: ICD-10-CM

## 2025-02-19 NOTE — TELEPHONE ENCOUNTER
TO: Please see [facility] requesting annual labs. Pended orders as were done for last year's physical. Please review and sign if you agree. TY

## 2025-03-03 ENCOUNTER — OFFICE VISIT (OUTPATIENT)
Dept: ORTHOPEDICS CLINIC | Facility: CLINIC | Age: 50
End: 2025-03-03
Payer: COMMERCIAL

## 2025-03-03 VITALS — WEIGHT: 242 LBS | HEIGHT: 72 IN | BODY MASS INDEX: 32.78 KG/M2

## 2025-03-03 DIAGNOSIS — M77.11 LATERAL EPICONDYLITIS OF RIGHT ELBOW: Primary | ICD-10-CM

## 2025-03-03 PROCEDURE — 99204 OFFICE O/P NEW MOD 45 MIN: CPT | Performed by: ORTHOPAEDIC SURGERY

## 2025-03-03 NOTE — H&P
Clinic Note     Assessment/Plan:  49 year old male    Right lateral epicondylitis-we discussed various nonsurgical treatment options to include continuing turmeric, using the elbow sleeve/elbow strap during daytime with painful activities, a wrist brace at nighttime, and formal therapy.  If symptoms fail to resolve at 6 months can consider Tenex procedure    Follow Up: As needed    Diagnostic Studies:     XR Right elbow 3 views: No fractures dislocations or osseous abnormalities       Physical Exam:     Ht 6' (1.829 m)   Wt 242 lb (109.8 kg)   BMI 32.82 kg/m²     Constitutional: NAD. AOx3. Well-developed and Well-nourished.   Psychiatric: Normal mood/ affect/ behavior. Judgment and thought content normal.     Right Upper Extremity:     Inspection    Skin intact. No skin lesions. No obvious mass visualized.    Palpation    Tenderness to palpation over the lateral epicondyle      ROM    Full composite fist., Normal symmetric wrist motion., and Normal symmetric elbow motion.     Neurovascular    Normal sensation in the median, ulnar, and radial nerve distribution. Normal motor function of muscles innervated by median/AIN, ulnar, and radial/PIN nerves.    Normally perfused hand(s).     Special    Pain with resisted middle finger extension and wrist extension          CC: Right elbow pain    HPI: This 49 year old LHD male presents with right elbow pain that started around Thanksgiving of 2024.  He hit the lateral aspect of his elbow and subsequent to that injury he noted increased discomfort with activities.  It was worst around Mayra and since has improved.  Around that time he did take a course of prednisone which helped he also takes turmeric daily which helps.  He notes throbbing aching type pain that is mild in intensity.    Occupation:     History/Other:   Past Medical History:    COVID-19    cold symptoms.    Disorder of liver    FATTY LIVER    Disorder of thyroid    Esophageal reflux     Flatulence/gas pain/belching    Headache disorder    Heartburn    High blood pressure    Hx of motion sickness    Problems with swallowing    Sleep apnea    Thyroid disease     Past Surgical History:   Procedure Laterality Date    Colonoscopy  Oct 2022    Colonoscopy      Sinus surgery        Upper gi endoscopy,exam       Current Outpatient Medications   Medication Sig Dispense Refill    LEVOTHYROXINE 200 MCG Oral Tab TAKE 1 TABLET BY MOUTH BEFORE BREAKFAST 90 tablet 0    amLODIPine 10 MG Oral Tab Take 1 tablet (10 mg total) by mouth daily. 90 tablet 0    Budesonide 1 MG/2ML Inhalation Suspension 1 mg/2 ml  po twice daily: Mix 5 packets of Splenda or mix with honey as slurry, swallow twice daily. Avoid eating or drinking x 1 hour after each use. Gargle water  for 30 secs at the back of the throat then spit after each use. 180 each 3    omeprazole 20 MG Oral Capsule Delayed Release Take one capsule (20 mg total) by mouth once daily, 30 minutes prior to breakfast. 90 capsule 3    triamcinolone 55 MCG/ACT Nasal Aerosol 1 spray by Nasal route daily as needed.      predniSONE 10 MG Oral Tab 4 PO daily for 2 days, then 3 PO daily for 2 days, the 2 PO daily for 2 days , then 1 PO daily for 2 days (Patient not taking: Reported on 3/3/2025) 20 tablet 0     Allergies[1]  Family History   Problem Relation Age of Onset    Cancer Other         Colon, Fam hx, Grandfather    Diabetes Other         Grandmother    Hypertension Father     Thyroid Disorder Mother     Cancer Maternal Grandmother         uterine    Diabetes Maternal Grandfather      Social History     Occupational History    Not on file   Tobacco Use    Smoking status: Never    Smokeless tobacco: Never   Vaping Use    Vaping status: Never Used   Substance and Sexual Activity    Alcohol use: No    Drug use: No    Sexual activity: Not on file          Review of Systems (negative unless bolded):  General: fevers, chills, fatigue  CV:  chest pain, palpitations, leg  swelling  Msk: bodyaches, neck pain, neck stiffness  Skin: rashes, open wounds, nonhealing ulcers  Hem: bleeds easily, bruise easily, immunocompromised  Neuro: dizziness, light headedness, headaches  Psych: anxious, depressed, anger issues      Alexia Vences MD   Hand, Wrist, & Elbow Surgery  [email or web address]  t: [phone]  f: [phone]       [1]   Allergies  Allergen Reactions    Losartan ANAPHYLAXIS    Amoxicillin OTHER (SEE COMMENTS)     Pt states it has not worked in the past      Sulfa Drugs Cross Reactors RASH

## 2025-03-05 LAB
ABSOLUTE BASOPHILS: 62 CELLS/UL (ref 0–200)
ABSOLUTE EOSINOPHILS: 270 CELLS/UL (ref 15–500)
ABSOLUTE LYMPHOCYTES: 1571 CELLS/UL (ref 850–3900)
ABSOLUTE MONOCYTES: 670 CELLS/UL (ref 200–950)
ABSOLUTE NEUTROPHILS: 5128 CELLS/UL (ref 1500–7800)
ALBUMIN/GLOBULIN RATIO: 2.1 (CALC) (ref 1–2.5)
ALBUMIN: 4.6 G/DL (ref 3.6–5.1)
ALKALINE PHOSPHATASE: 131 U/L (ref 36–130)
ALT: 25 U/L (ref 9–46)
APPEARANCE: CLEAR
AST: 19 U/L (ref 10–40)
BASOPHILS: 0.8 %
BILIRUBIN, TOTAL: 0.9 MG/DL (ref 0.2–1.2)
BILIRUBIN: NEGATIVE
BUN: 18 MG/DL (ref 7–25)
CALCIUM: 9.3 MG/DL (ref 8.6–10.3)
CARBON DIOXIDE: 27 MMOL/L (ref 20–32)
CHLORIDE: 105 MMOL/L (ref 98–110)
CHOL/HDLC RATIO: 4.3 (CALC)
CHOLESTEROL, TOTAL: 171 MG/DL
COLOR: YELLOW
CREATININE: 0.92 MG/DL (ref 0.6–1.29)
EGFR: 102 ML/MIN/1.73M2
EOSINOPHILS: 3.5 %
GLOBULIN: 2.2 G/DL (CALC) (ref 1.9–3.7)
GLUCOSE: 97 MG/DL (ref 65–99)
GLUCOSE: NEGATIVE
HDL CHOLESTEROL: 40 MG/DL
HEMATOCRIT: 47.2 % (ref 38.5–50)
HEMOGLOBIN: 15.5 G/DL (ref 13.2–17.1)
KETONES: NEGATIVE
LDL-CHOLESTEROL: 110 MG/DL (CALC)
LEUKOCYTE ESTERASE: NEGATIVE
LYMPHOCYTES: 20.4 %
MCH: 29.1 PG (ref 27–33)
MCHC: 32.8 G/DL (ref 32–36)
MCV: 88.7 FL (ref 80–100)
MONOCYTES: 8.7 %
MPV: 10.6 FL (ref 7.5–12.5)
NEUTROPHILS: 66.6 %
NITRITE: NEGATIVE
NON-HDL CHOLESTEROL: 131 MG/DL (CALC)
OCCULT BLOOD: NEGATIVE
PH: 5.5 (ref 5–8)
PLATELET COUNT: 195 THOUSAND/UL (ref 140–400)
POTASSIUM: 3.9 MMOL/L (ref 3.5–5.3)
PROTEIN, TOTAL: 6.8 G/DL (ref 6.1–8.1)
PROTEIN: NEGATIVE
PSA, TOTAL: 0.47 NG/ML
RDW: 14 % (ref 11–15)
RED BLOOD CELL COUNT: 5.32 MILLION/UL (ref 4.2–5.8)
SODIUM: 141 MMOL/L (ref 135–146)
SPECIFIC GRAVITY: 1.02 (ref 1–1.03)
T4, FREE: 1.5 NG/DL (ref 0.8–1.8)
TRIGLYCERIDES: 104 MG/DL
TSH: 2.36 MIU/L (ref 0.4–4.5)
WHITE BLOOD CELL COUNT: 7.7 THOUSAND/UL (ref 3.8–10.8)

## 2025-03-11 ENCOUNTER — TELEPHONE (OUTPATIENT)
Dept: INTERNAL MEDICINE CLINIC | Facility: CLINIC | Age: 50
End: 2025-03-11

## 2025-03-11 ENCOUNTER — OFFICE VISIT (OUTPATIENT)
Dept: INTERNAL MEDICINE CLINIC | Facility: CLINIC | Age: 50
End: 2025-03-11
Payer: COMMERCIAL

## 2025-03-11 VITALS
BODY MASS INDEX: 32.51 KG/M2 | TEMPERATURE: 98 F | WEIGHT: 240 LBS | SYSTOLIC BLOOD PRESSURE: 128 MMHG | DIASTOLIC BLOOD PRESSURE: 82 MMHG | HEIGHT: 72 IN | OXYGEN SATURATION: 96 % | HEART RATE: 95 BPM

## 2025-03-11 DIAGNOSIS — Z00.00 ENCOUNTER FOR WELLNESS EXAMINATION: Primary | ICD-10-CM

## 2025-03-11 PROCEDURE — 99396 PREV VISIT EST AGE 40-64: CPT | Performed by: FAMILY MEDICINE

## 2025-03-11 NOTE — PROGRESS NOTES
Riky Nixon is a 49 year old male who presents for a complete physical exam.   HPI:       Wt Readings from Last 6 Encounters:   03/11/25 240 lb (108.9 kg)   03/03/25 242 lb (109.8 kg)   01/03/25 246 lb 6.4 oz (111.8 kg)   08/28/24 247 lb 3.2 oz (112.1 kg)   08/19/24 252 lb 3.2 oz (114.4 kg)   08/14/24 245 lb 13 oz (111.5 kg)     Body mass index is 32.55 kg/m².     CHOLESTEROL, TOTAL (mg/dL)   Date Value   03/04/2025 171   02/27/2024 155   02/27/2023 186     HDL CHOLESTEROL (mg/dL)   Date Value   03/04/2025 40   02/27/2024 31 (L)   02/27/2023 35 (L)     LDL-CHOLESTEROL (mg/dL (calc))   Date Value   03/04/2025 110 (H)   02/27/2024 96   02/27/2023 119 (H)     AST (U/L)   Date Value   03/04/2025 19   08/15/2024 17   02/27/2024 39   02/27/2023 51 (H)     ALT (U/L)   Date Value   03/04/2025 25   08/15/2024 31   02/27/2024 65 (H)   02/27/2023 96 (H)      Current Outpatient Medications   Medication Sig Dispense Refill    Budesonide 1 MG/2ML Inhalation Suspension TAKE AS INSTRUCTED BY YOUR PRESCRIBER 360 mL 3    LEVOTHYROXINE 200 MCG Oral Tab TAKE 1 TABLET BY MOUTH BEFORE BREAKFAST 90 tablet 0    amLODIPine 10 MG Oral Tab Take 1 tablet (10 mg total) by mouth daily. 90 tablet 0    omeprazole 20 MG Oral Capsule Delayed Release Take one capsule (20 mg total) by mouth once daily, 30 minutes prior to breakfast. 90 capsule 3    triamcinolone 55 MCG/ACT Nasal Aerosol 1 spray by Nasal route daily as needed.        Past Medical History:    Allergic rhinitis    COVID-19    cold symptoms.    Disorder of liver    FATTY LIVER    Disorder of thyroid    Esophageal reflux    Flatulence/gas pain/belching    Headache disorder    Heartburn    High blood pressure    Hx of motion sickness    Hyperthyroidism    Diagnosed in this office.    Hypothyroidism    It's one of these, either under or over    Obesity    Problems with swallowing    Sleep apnea    Thyroid disease      Past Surgical History:   Procedure Laterality Date    Colonoscopy   Oct 2022    Colonoscopy      Other surgical history  June 2022    Sinus Surgery    Sinus surgery        Upper gi endoscopy,exam        Family History   Problem Relation Age of Onset    Cancer Other         Colon, Fam hx, Grandfather    Diabetes Other         Grandmother    Hypertension Father     Thyroid Disorder Mother     Cancer Maternal Grandmother         uterine    Diabetes Maternal Grandfather     Cancer Maternal Grandfather       Social History:  Social History     Socioeconomic History    Marital status:    Tobacco Use    Smoking status: Never    Smokeless tobacco: Never   Vaping Use    Vaping status: Never Used   Substance and Sexual Activity    Alcohol use: No    Drug use: No   Other Topics Concern    Caffeine Concern Yes    Stress Concern Yes    Weight Concern Yes    Special Diet No    Exercise Yes    Seat Belt Yes     Social Drivers of Health     Food Insecurity: No Food Insecurity (3/11/2025)    NCSS - Food Insecurity     Worried About Running Out of Food in the Last Year: No     Ran Out of Food in the Last Year: No   Transportation Needs: No Transportation Needs (3/11/2025)    NCSS - Transportation     Lack of Transportation: No   Housing Stability: Not At Risk (3/11/2025)    NCSS - Housing/Utilities     Has Housing: Yes     Worried About Losing Housing: No     Unable to Get Utilities: No      .        REVIEW OF SYSTEMS:   GENERAL: feels well otherwise  SKIN: denies rash  HEENT: slight URI from last week  getting better      LUNGS: denies shortness of breath  CARDIOVASCULAR: denies chest pain on exertion  GI: denies abdominal pain  : denies dysuria  NEURO: denies headaches  PSYCHE: denies depression or anxiety  HEMATOLOGIC: denies hx of anemia  ENDOCRINE:+  thyroid history  ALL/ASTHMA: denies hx of allergy or asthma    EXAM:   /70 (BP Location: Left arm, Patient Position: Sitting, Cuff Size: adult)   Pulse 95   Temp 97.6 °F (36.4 °C) (Temporal)   Ht 6' (1.829 m)   Wt 240 lb (108.9  kg)   SpO2 96%   BMI 32.55 kg/m²   Body mass index is 32.55 kg/m².   GENERAL: well developed, well nourished,in no apparent distress  SKIN: no rashes,  HEENT: atraumatic, normocephalic,ears and throat are clear  NECK: supple,no adenopathy  LUNGS: clear to auscultation  CARDIO: RRR without murmur  GI: good BS's,no masses, HSM or tenderness  : two descended testes,no masses,no hernia,no penile lesions  RECTAL: good rectal tone, prostate shows no masses, stool is OB negative  EXTREMITIES: no edema  NEURO: motor and sensory are grossly intact    ASSESSMENT AND PLAN:   Riky Nixon is a 49 year old male who presents for a complete physical exam.  Health maintenance, discussed labs  .appears stable   discussed [facility]    has slight URI that is getting better    pleased w weight loss    The patient indicates understanding of these issues and agrees to the plan.  The patient is asked to return for CPX in 1yr.

## 2025-03-16 RX ORDER — AMLODIPINE BESYLATE 10 MG/1
10 TABLET ORAL DAILY
Qty: 90 TABLET | Refills: 0 | Status: SHIPPED | OUTPATIENT
Start: 2025-03-16

## 2025-03-26 ENCOUNTER — HOSPITAL ENCOUNTER (OUTPATIENT)
Dept: CT IMAGING | Age: 50
Discharge: HOME | End: 2025-03-26
Attending: FAMILY MEDICINE

## 2025-03-26 DIAGNOSIS — Z13.9 SCREENING PROCEDURE: ICD-10-CM

## 2025-04-09 ENCOUNTER — TELEPHONE (OUTPATIENT)
Dept: INTERNAL MEDICINE CLINIC | Facility: CLINIC | Age: 50
End: 2025-04-09

## 2025-04-09 NOTE — TELEPHONE ENCOUNTER
Incoming fax from [employer]    Patients approval for CPAP machine beginning 4/8/2025-6/6/2025      Placed in TO in basket for review

## 2025-05-04 ENCOUNTER — PATIENT MESSAGE (OUTPATIENT)
Dept: INTERNAL MEDICINE CLINIC | Facility: CLINIC | Age: 50
End: 2025-05-04

## 2025-05-04 DIAGNOSIS — K20.90 ESOPHAGITIS: Primary | ICD-10-CM

## 2025-05-06 ENCOUNTER — TELEPHONE (OUTPATIENT)
Dept: PHYSICAL THERAPY | Facility: HOSPITAL | Age: 50
End: 2025-05-06

## 2025-05-06 ENCOUNTER — OFFICE VISIT (OUTPATIENT)
Dept: OCCUPATIONAL MEDICINE | Age: 50
End: 2025-05-06
Attending: ORTHOPAEDIC SURGERY
Payer: COMMERCIAL

## 2025-05-06 DIAGNOSIS — M77.11 LATERAL EPICONDYLITIS OF RIGHT ELBOW: Primary | ICD-10-CM

## 2025-05-06 PROCEDURE — 97110 THERAPEUTIC EXERCISES: CPT

## 2025-05-06 PROCEDURE — 97165 OT EVAL LOW COMPLEX 30 MIN: CPT

## 2025-05-06 NOTE — PROGRESS NOTES
OT UE EVALUATION:     Diagnosis:   Lateral epicondylitis of right elbow (M77.11) Patient:  Riky Nixon (49 year old, male)        Referring Provider: Alen Vences  Today's Date   5/6/2025    Precautions:  Drug Allergy   Date of Evaluation: 05/06/25  Next MD visit: TBD  Date of Injury: Late November 2024  Date of Surgery: NA     PATIENT SUMMARY   Summary of chief complaints: Elbow pain  History of current condition: Pt hit the elbow at Thanksgiving which caused pain which worsened around Mayra. Pt didtry steroid treatment which did not help and was referred to ortho.  Pt was then referred for therapy.  Pt has used compression sleeves   Pain level: current 0 /10, at best 0 /10, at worst 1 /10  Description of symptoms: Pain at lateral epicondyle, especially with \"twisting\" motion   Occupation: Pt is a    Occupational Roles: worker; cook   Leisure activities/Hobbies: not assessed   Prior level of function: independent with ADL/IADL  Current limitations: Mousing, carrying objects  Pt goals: cut down on recovery time, decrease pain  Hand Dominance: left  Living Situation: family    Imaging/Tests: xray   Riky  has a past medical history of Allergic rhinitis (Birth), COVID-19 (01/03/2022), Disorder of liver, Disorder of thyroid, Esophageal reflux, Flatulence/gas pain/belching, Headache disorder, Heartburn (Years), High blood pressure, motion sickness, Hyperthyroidism, Hypothyroidism, Obesity, Problems with swallowing (01/2022), Sleep apnea, and Thyroid disease.  He  has a past surgical history that includes sinus surgery  ; colonoscopy (Oct 2022); upper gi endoscopy,exam; colonoscopy; and other surgical history (June 2022).    ASSESSMENT  Riky presents to occupational therapy evaluation with primary c/o Elbow pain. The results of the objective tests and measures show Pain with resisted digit/wrist extension, decreased  with pain in stress position. Functional deficits include but are not limited to  Mousing, carrying objects. Signs and symptoms are consistent with diagnosis of Lateral epicondylitis of right elbow (M77.11). Pt and OT discussed evaluation findings, pathology, POC and HEP.  Pt voiced understanding and performs HEP correctly without reported pain. Skilled Occupational Therapy is medically necessary to address the above impairments and reach functional goals.  OBJECTIVE:    Musculoskeletal  Observation: Unremarkable Unremarkable       Orthotics: Elbow sleeve used intermittently         Special Tests: (+) LF test, mild (+) wrist extension test, mild (+) resisted supination     ROM and Strength  (* denotes performed with pain)  Hand Strength (lbs) R L      83, 94, 92 av.6 ( (stress position)=75 with 1-2/10 pain) 88, 89, 87 av ( (stress position)=79)         Edema:  Circum Edema (cm) Elbow Crease     Right 27.3 cm      Left 28.3 cm        Neurological:  Sensory: WNL (at time of eval, has noted ulnar side tingling periodically)        ADLs/IADLs:  ADL's    Bathing: Ind     Dressing: ind     Feeding: ind     Grooming: ind  IADL's     Homemaking: min difficulty     Food Prep: min difficulty     Driving: ind   Other Functional Mobility/ADL Comments: ind      Today's Treatment and Response:   Pt education was provided on exam findings, treatment diagnosis, treatment plan, expectations, and prognosis.  Today's Treatment       2025   OT Treatment   Therapeutic Exercise Gentle ROM to wrist flexion/extension  Forearm rotation  Elbow flexion/extension   Additional Treatmnt Kinesiotape applied  Fascia correction  Buttonhole to lateral epicondyle   Therapeutic Exercise Min 10   Eval Min 30   Total Timed Procedures 10   Total Service Procedures 40   Total Time 40         Patient was instructed in and issued a HEP for: Information on lateral epicondylitis  Use of heat/cold  STM  Gentle ROM to wrist, forearm, elbow  Use of strap/splint     Charges:  OT EVAL Low Complexity, 1 TE   Based on  analysis of data from a problem-focused assessment from a brief chart review, clinical presentation of physical, cognitive and psychosocial skills, as well as review of patient rated outcome measures, this evaluation involved Low complexity decision making, with 1-3 occupational performance component deficits, no comorbidities, and no need for modification of tasks or assistance with assessment.                                                                                    PLAN OF CARE:    Goals: (to be met in 8 visits)    Not Met Progress Toward Partially Met Met   Patient will report no more than 0/10 pain during self-care skill performance. [] [] [] []   Pt will demonstrate independence and compliance with HEP to maximize gains made in occupational therapy and progress toward functional independence.    [] [] [] []   Patient will demonstrate independent awareness of postural corrections to provide proximal stability for distal limb movements. [] [] [] []   Patient to verbalize and demonstrate understanding of aggravating and alleviating factors related to lateral epicondylitis to decrease risk of recurrence for improved functional use of right UE. [] [] [] []   Pt will increase  in stress position to equal to L with 0/10 pain for use while manipulating mouse and controls [] [] [] []    [] [] [] []         Frequency / Duration: Patient will be seen 1-2x/week or a total of 8 visits over a 90 day period. Treatment will include: Manual Therapy; Neuromuscular Re-education; Self-Care Home Management; Electrical stimulation (unattended); Therapeutic Exercise; Therapeutic Activities; Ultrasound; Other (use comment) (splinting PRN)    Education or treatment limitation: None   Rehab Potential: good     QuickDASH Outcome Score  Score: (Patient-Rptd) 4.55 % (5/3/2025 12:01 PM)      Patient/Family/Caregiver was advised of these findings, precautions, and treatment options and has agreed to actively participate in  planning and for this course of care.    Thank you for your referral. Please co-sign or sign and return this letter via fax as soon as possible to [phone]. If you have any questions, please contact me at Dept: [phone]    Sincerely,  Electronically signed by therapist: Griselda Reeder, OT  Physician's certification required: Yes  I certify the need for these services furnished under this plan of treatment and while under my care.    X___________________________________________________ Date____________________    Certification From: 5/6/2025  To:8/4/2025

## 2025-05-09 ENCOUNTER — OFFICE VISIT (OUTPATIENT)
Dept: OCCUPATIONAL MEDICINE | Age: 50
End: 2025-05-09
Attending: ORTHOPAEDIC SURGERY
Payer: COMMERCIAL

## 2025-05-09 PROCEDURE — 97140 MANUAL THERAPY 1/> REGIONS: CPT

## 2025-05-09 PROCEDURE — 97035 APP MDLTY 1+ULTRASOUND EA 15: CPT

## 2025-05-09 PROCEDURE — 97110 THERAPEUTIC EXERCISES: CPT

## 2025-05-09 NOTE — PROGRESS NOTES
Patient: Riky Nixon (49 year old, male) Referring Provider:  Insurance:   Diagnosis: Lateral epicondylitis of right elbow (M77.11) Alen Vences  [facility]   Date of Surgery: NA Next MD visit:  [employer] OUT OF STATE   Precautions:  Drug Allergy TBD Referral Information:   Date of Injury: Late November 2024 Date of Evaluation: Req: 0, Auth: 0, Exp:     05/06/25 POC Auth Visits:          Today's Date   5/9/2025    Subjective  \"I stopped taking the Tumeric and I think that was masking it.\"       Pain: 1/10     Objective  Trigger points through the UE.  Redness and skin irritation present where Kinesiotape applied             Assessment  Pain decreased by conclusion of session. Tolerating eccentric exercises (Jt Twist) to lengthen the wrist extensors under load with little increase in pain.    Goals (to be met in 8 visits)      Not Met Progress Toward Partially Met Met   Patient will report no more than 0/10 pain during self-care skill performance. [] [] [] []   Pt will demonstrate independence and compliance with HEP to maximize gains made in occupational therapy and progress toward functional independence.    [] [] [] []   Patient will demonstrate independent awareness of postural corrections to provide proximal stability for distal limb movements. [] [] [] []   Patient to verbalize and demonstrate understanding of aggravating and alleviating factors related to lateral epicondylitis to decrease risk of recurrence for improved functional use of right UE. [] [] [] []   Pt will increase  in stress position to equal to L with 0/10 pain for use while manipulating mouse and controls [] [] [] []    [] [] [] []             Plan  Continue OT 1-2x/wk x 6 visits for pain management, progress strengthening PRN    Treatment Last 4 Visits        5/6/2025 5/9/2025   OT Treatment   Treatment Day  2   Therapeutic Exercise Gentle ROM to wrist flexion/extension  Forearm rotation  Elbow flexion/extension Jt twist  with beige flex bar x 10    Wrist extensor stretch x 10   Manual Therapy  IASTM   Sweep/scoop through wrist extensors  Framing to lateral epicondyle  Sweep/scoop biceps, brachioradialis, trigger point release   Additional Treatmnt Kinesiotape applied  Fascia correction  Buttonhole to lateral epicondyle    Modalities  US 1.2 w/cm2, 2 mhz, 50%, 8 min to common wrist extensor origin   Therapeutic Exercise Min 10 10   Manual Therapy Min  20   Ultrasound Min  8   Eval Min 30    Total Timed Procedures 10 38   Total Service Procedures 40 38   Total Time 40 38         HEP  Wrist extensor stretch x 10    Charges     1 US, 1 MT, 1 TE    Griselda Reeder, OTR/L

## 2025-05-12 ENCOUNTER — PATIENT MESSAGE (OUTPATIENT)
Dept: INTERNAL MEDICINE CLINIC | Facility: CLINIC | Age: 50
End: 2025-05-12

## 2025-05-13 ENCOUNTER — OFFICE VISIT (OUTPATIENT)
Dept: OCCUPATIONAL MEDICINE | Age: 50
End: 2025-05-13
Attending: ORTHOPAEDIC SURGERY
Payer: COMMERCIAL

## 2025-05-13 PROCEDURE — 97110 THERAPEUTIC EXERCISES: CPT

## 2025-05-13 PROCEDURE — 97140 MANUAL THERAPY 1/> REGIONS: CPT

## 2025-05-13 NOTE — PROGRESS NOTES
Patient: Riky Nixon (49 year old, male) Referring Provider:  Insurance:   Diagnosis: Lateral epicondylitis of right elbow (M77.11) Alen Vences  [facility] [facility]   Date of Surgery: NA Next MD visit:  [employer] OUT OF STATE   Precautions:  Drug Allergy TBD Referral Information:   Date of Injury: Late November 2024 Date of Evaluation: Req: 0, Auth: 0, Exp:     05/06/25 POC Auth Visits:          Today's Date   5/13/2025    Subjective  \"Up above the elbow it's still sore.\" \"I did feel it a little more this weekend but I spent the whole weekend on a computer so I used it and aggrevated it some\"       Pain: 2/10     Objective  Significant trigger points at brachioradialis and into distal biceps             Assessment  IASTM and cupping performed throughout wrist extensor wad and also into the upper arm as pt tends to hold tension through the elbow. Discussed avoidance of keeping elbow bent past 90 deg for long periods and pressure to outer elbow as pt notes occasional D5 numbess with computer use. Scapular stabilization tasks added as pt tends to rest in forward rounded posture.    Goals (to be met in 8 visits)      Not Met Progress Toward Partially Met Met   Patient will report no more than 0/10 pain during self-care skill performance. [] [] [] []   Pt will demonstrate independence and compliance with HEP to maximize gains made in occupational therapy and progress toward functional independence.    [] [] [] []   Patient will demonstrate independent awareness of postural corrections to provide proximal stability for distal limb movements. [] [x] [] []   Patient to verbalize and demonstrate understanding of aggravating and alleviating factors related to lateral epicondylitis to decrease risk of recurrence for improved functional use of right UE. [] [x] [] []   Pt will increase  in stress position to equal to L with 0/10 pain for use while manipulating mouse and controls [] [] [] []    [] [] [] []              Plan  Continue OT 1-2x/wk x 6 visits for pain management, progress strengthening PRN    Treatment Last 4 Visits        5/6/2025 5/9/2025 5/13/2025   OT Treatment   Treatment Day  2 3   Therapeutic Exercise Gentle ROM to wrist flexion/extension  Forearm rotation  Elbow flexion/extension Jt twist with beige flex bar x 10    Wrist extensor stretch x 10 Upper cycle 5 min, no resistance, forward/reverse    Scapular stabilization on wall (butterfly)    Ball alphabet on wall    Wall vs    Supine:  Dowel jass shoulder flexion/extension  Chest press  Each x 20    ROM to elbow end range flexion/extension  Wrist flexion/extension  Pronation/supination   Manual Therapy  IASTM   Sweep/scoop through wrist extensors  Framing to lateral epicondyle  Sweep/scoop biceps, brachioradialis, trigger point release IASTM through wrist extensor wad, framing to lateral epicondyle and strumming to trigger points.  IASTM brachioradialis and into biceps muscle belly    Cupping to same   Additional Treatmnt Kinesiotape applied  Fascia correction  Buttonhole to lateral epicondyle     Modalities  US 1.2 w/cm2, 2 mhz, 50%, 8 min to common wrist extensor origin    Therapeutic Exercise Min 10 10 20   Manual Therapy Min  20 25   Ultrasound Min  8    Eval Min 30     Total Timed Procedures 10 38 45   Total Service Procedures 40 38 45   Total Time 40 38 45         HEP  Scapular stabilization on wall (butterfly)    Ball alphabet on wall    Wall vs    Charges     2 MT, 1 TE    Griselda Reeder, OTR/L

## 2025-05-14 NOTE — TELEPHONE ENCOUNTER
TO: Pended letter per patient request. Latest recorded BP was 3/11/2025. Please review, edit as necessary. TY   sob sob

## 2025-05-15 ENCOUNTER — OFFICE VISIT (OUTPATIENT)
Dept: OCCUPATIONAL MEDICINE | Age: 50
End: 2025-05-15
Attending: ORTHOPAEDIC SURGERY
Payer: COMMERCIAL

## 2025-05-15 PROCEDURE — 97140 MANUAL THERAPY 1/> REGIONS: CPT

## 2025-05-15 PROCEDURE — 97014 ELECTRIC STIMULATION THERAPY: CPT

## 2025-05-15 PROCEDURE — 97110 THERAPEUTIC EXERCISES: CPT

## 2025-05-15 NOTE — PROGRESS NOTES
Patient: Riky Nixon (49 year old, male) Referring Provider:  Insurance:   Diagnosis: Lateral epicondylitis of right elbow (M77.11) Alen Vences  [employer] [facility]   Date of Surgery: NA Next MD visit:  BCBS OUT OF STATE   Precautions:  Drug Allergy TBD Referral Information:   Date of Injury: Late November 2024 Date of Evaluation: Req: 0, Auth: 0, Exp:     05/06/25 POC Auth Visits:  8       Today's Date   5/15/2025    Subjective  \"Not constant. The whole arm was a little sore yesterday though\"       Pain: 1/10     Objective  Sensitivity to taping, also sensitivity to cupping present.             Assessment  Pt tolerating light eccentric strengthening in clinic with little to no increase in pain.  Unable to continue taping due to skin sensitivity. Trial of IFC with ice this visit following performance of light activity.    Goals (to be met in 8 visits)      Not Met Progress Toward Partially Met Met   Patient will report no more than 0/10 pain during self-care skill performance. [] [] [] []   Pt will demonstrate independence and compliance with HEP to maximize gains made in occupational therapy and progress toward functional independence.    [] [] [] []   Patient will demonstrate independent awareness of postural corrections to provide proximal stability for distal limb movements. [] [x] [] []   Patient to verbalize and demonstrate understanding of aggravating and alleviating factors related to lateral epicondylitis to decrease risk of recurrence for improved functional use of right UE. [] [x] [] []   Pt will increase  in stress position to equal to L with 0/10 pain for use while manipulating mouse and controls [] [] [] []    [] [] [] []                 Plan  Continue OT 1-2x/wk x 6 visits for pain management, progress strengthening PRN. Continue wrist stabilization tasks.    Treatment Last 4 Visits        5/6/2025 5/9/2025 5/13/2025 5/15/2025   OT Treatment   Treatment Day  2 3 4   Therapeutic Exercise  Gentle ROM to wrist flexion/extension  Forearm rotation  Elbow flexion/extension Jt twist with beige flex bar x 10    Wrist extensor stretch x 10 Upper cycle 5 min, no resistance, forward/reverse    Scapular stabilization on wall (butterfly)    Ball alphabet on wall    Wall vs    Supine:  Dowel jass shoulder flexion/extension  Chest press  Each x 20    ROM to elbow end range flexion/extension  Wrist flexion/extension  Pronation/supination Wrist stabilization  Writing alphabet on weighted ball with dowel    Digiflex   Blue full  x 20  Green each digit x 15  Tripod pinch x 20    PREs (pain free range)  2 lb  Bicep curl  Triceps extension   Each x 20    Eccentric load and lengthen with flex bar  Wrist extension    Wrist flexion  Each x 20       Manual Therapy  IASTM   Sweep/scoop through wrist extensors  Framing to lateral epicondyle  Sweep/scoop biceps, brachioradialis, trigger point release IASTM through wrist extensor wad, framing to lateral epicondyle and strumming to trigger points.  IASTM brachioradialis and into biceps muscle belly    Cupping to same Skin mobilization posterior elbow    Fascia mobilization over wrist extensors into upper arm    Passive ROM to wrist and elbow    Combined motions to end range    Self stretch wrist and elbow over ball   Additional Treatmnt Kinesiotape applied  Fascia correction  Buttonhole to lateral epicondyle      Modalities  US 1.2 w/cm2, 2 mhz, 50%, 8 min to common wrist extensor origin  IFC, quad, sweep to pt tolerance, 10 min with ice   Therapeutic Exercise Min 10 10 20 20   Manual Therapy Min  20 25 15   Ultrasound Min  8     Eval Min 30      E-Stim Unattended Min    10   Total Timed Procedures 10 38 45 35   Total Service Procedures 40 38 45 45   Total Time 40 38 45 45         HEP  Self stretch wrist flexion/extension    Charges     1 electrical stim, 1 MT, 1 TE    Griselda Reeder, OTR/L

## 2025-05-20 ENCOUNTER — OFFICE VISIT (OUTPATIENT)
Dept: OCCUPATIONAL MEDICINE | Age: 50
End: 2025-05-20
Attending: ORTHOPAEDIC SURGERY
Payer: COMMERCIAL

## 2025-05-20 PROCEDURE — 97110 THERAPEUTIC EXERCISES: CPT

## 2025-05-20 PROCEDURE — 97014 ELECTRIC STIMULATION THERAPY: CPT

## 2025-05-20 PROCEDURE — 97140 MANUAL THERAPY 1/> REGIONS: CPT

## 2025-05-20 NOTE — PROGRESS NOTES
Patient: Riky Nixon (49 year old, male) Referring Provider:  Insurance:   Diagnosis: Lateral epicondylitis of right elbow (M77.11) Alen Vences  [employer] [facility]   Date of Surgery: NA Next MD visit:  BCBS OUT OF STATE   Precautions:  Drug Allergy TBD Referral Information:   Date of Injury: Late November 2024 Date of Evaluation: Req: 0, Auth: 0, Exp:     05/06/25 POC Auth Visits:  8       Today's Date   5/20/2025    Subjective  \"It's early in the morning so it doesn't hurt yet\"       Pain: 0/10     Objective  Less trigger points noted through wrist extensors or upper arm.             Assessment  Pain minimal at start of session and pt tolerating light UE strengthening with little to no increase in pain at the lateral epicondyle, though shoulder pain present at times. Pain has been occuring with less intensity and frequency during ADL.    Goals (to be met in 8 visits)      Not Met Progress Toward Partially Met Met   Patient will report no more than 0/10 pain during self-care skill performance. [] [x] [] []   Pt will demonstrate independence and compliance with HEP to maximize gains made in occupational therapy and progress toward functional independence.    [] [] [x] []   Patient will demonstrate independent awareness of postural corrections to provide proximal stability for distal limb movements. [] [x] [] []   Patient to verbalize and demonstrate understanding of aggravating and alleviating factors related to lateral epicondylitis to decrease risk of recurrence for improved functional use of right UE. [] [x] [] []   Pt will increase  in stress position to equal to L with 0/10 pain for use while manipulating mouse and controls [] [] [] []    [] [] [] []           Plan  Continue OT 1-2x/wk x 4 visits for pain management, progress strengthening PRN. Continue wrist stabilization tasks.  Possible theraband    Treatment Last 4 Visits        5/9/2025 5/13/2025 5/15/2025 5/20/2025   OT Treatment   Treatment  Day 2 3 4 5   Therapeutic Exercise Jt twist with beige flex bar x 10    Wrist extensor stretch x 10 Upper cycle 5 min, no resistance, forward/reverse    Scapular stabilization on wall (butterfly)    Ball alphabet on wall    Wall vs    Supine:  Dowel jass shoulder flexion/extension  Chest press  Each x 20    ROM to elbow end range flexion/extension  Wrist flexion/extension  Pronation/supination Wrist stabilization  Writing alphabet on weighted ball with dowel    Digiflex   Blue full  x 20  Green each digit x 15  Tripod pinch x 20    PREs (pain free range)  2 lb  Bicep curl  Triceps extension   Each x 20    Eccentric load and lengthen with flex bar  Wrist extension    Wrist flexion  Each x 20     Wall weight bands light  -high row: scapular retraction  -low pronated row  -triceps extension  -bicep curl  -shoulder extension  Each x 20    Scapular stabilization  Wall alphabet on ball     2 lb ball  Ball pass x 20  Overhead/behind back pass 10/10   Manual Therapy IASTM   Sweep/scoop through wrist extensors  Framing to lateral epicondyle  Sweep/scoop biceps, brachioradialis, trigger point release IASTM through wrist extensor wad, framing to lateral epicondyle and strumming to trigger points.  IASTM brachioradialis and into biceps muscle belly    Cupping to same Skin mobilization posterior elbow    Fascia mobilization over wrist extensors into upper arm    Passive ROM to wrist and elbow    Combined motions to end range    Self stretch wrist and elbow over ball IASTM  Sweep/scoop through wrist extensor wad  Framing to lateral epincondyle  Strumming to trigger points       Modalities US 1.2 w/cm2, 2 mhz, 50%, 8 min to common wrist extensor origin  IFC, quad, sweep to pt tolerance, 10 min with ice IFC, quad, target to lateral epicondyle, 10 min to pt tolerance with ice   Therapeutic Exercise Min 10 20 20 20   Manual Therapy Min 20 25 15 15   Ultrasound Min 8      E-Stim Unattended Min   10 10   Total Timed Procedures 38  45 35 35   Total Service Procedures 38 45 45 45   Total Time 38 45 45 45         HEP   Postural awareness    Charges     1 MT, 1 TE, 1 electrical stim    Griselda Reeder, OTR/L

## 2025-05-21 DIAGNOSIS — E03.9 HYPOTHYROIDISM, UNSPECIFIED TYPE: ICD-10-CM

## 2025-05-21 RX ORDER — LEVOTHYROXINE SODIUM 200 UG/1
200 TABLET ORAL
Qty: 90 TABLET | Refills: 0 | Status: SHIPPED | OUTPATIENT
Start: 2025-05-21

## 2025-05-21 NOTE — TELEPHONE ENCOUNTER
Requesting   Name from pharmacy: Levothyroxine Sodium 200 MCG Oral Tablet          Will file in chart as: LEVOTHYROXINE 200 MCG Oral Tab    Sig: TAKE 1 TABLET BY MOUTH BEFORE BREAKFAST    Disp: 90 tablet    Refills: 0    Start: 5/21/2025    Class: Normal    Non-formulary For: Hypothyroidism, unspecified type    Last ordered: 3 months ago (2/2/2025) by Meera Zepeda MD    Last refill: 2/3/2025    Rx #: [id number]    Thyroid Medication Protocol Dmjjvy0205/21/2025 06:37 AM   Protocol Details TSH in past 12 months    Last TSH value is normal    In person appointment or virtual visit in the past 12 mos or appointment in next 3 mos    Medication is active on med list        LOV: 3/11/2025  RTC: 1 year   Last Relevant Labs: 3/5/2025  Filled: 2/2/2025 #90 with 0 refills    Future Appointments   Date Time Provider Department Center   5/22/2025  1:15 PM Griselda Reeder OT PF [facility]   5/27/2025  1:15 PM Griselda Reeder OT PF [facility]   5/29/2025 10:15 AM Griselda Reeder OT PF [facility]

## 2025-05-22 ENCOUNTER — OFFICE VISIT (OUTPATIENT)
Dept: OCCUPATIONAL MEDICINE | Age: 50
End: 2025-05-22
Attending: ORTHOPAEDIC SURGERY
Payer: COMMERCIAL

## 2025-05-22 PROCEDURE — 97110 THERAPEUTIC EXERCISES: CPT

## 2025-05-22 NOTE — PROGRESS NOTES
Patient: Riky Nixon (49 year old, male) Referring Provider:  Insurance:   Diagnosis: Lateral epicondylitis of right elbow (M77.11) Alen Vences  [employer] [facility]   Date of Surgery: NA Next MD visit:  BCBS OUT OF STATE   Precautions:  Drug Allergy TBD Referral Information:   Date of Injury: Late 2024 Date of Evaluation: Req: 0, Auth: 0, Exp:     25 POC Auth Visits:  8       Today's Date   2025    Subjective  \"It depends on what time of day it is.  We moved some things yesterday so this morning it was sore.\"       Pain: 2/10     Objective       Hand Strength       2025   Hand Strength    Rt 83, 94, 92 av.6        (stress position)=75 with 1-2/10 pain 100        (stress position)=85 with no pain but discomfort    Lt 88, 89, 87 av        (stress position)=79 87            Assessment  PREs performed with no lasting increase in elbow pain, though pt did note discomfort/fatigue in shoulders.  Pain present generally only with twisting motions or while lifting with outstretched UE.    Goals (to be met in 8 visits)      Not Met Progress Toward Partially Met Met   Patient will report no more than 0/10 pain during self-care skill performance. [] [x] [] []   Pt will demonstrate independence and compliance with HEP to maximize gains made in occupational therapy and progress toward functional independence.    [] [] [x] []   Patient will demonstrate independent awareness of postural corrections to provide proximal stability for distal limb movements. [] [x] [] []   Patient to verbalize and demonstrate understanding of aggravating and alleviating factors related to lateral epicondylitis to decrease risk of recurrence for improved functional use of right UE. [] [x] [] []   Pt will increase  in stress position to equal to L with 0/10 pain for use while manipulating mouse and controls [] [] [] []    [] [] [] []               Plan  Continue OT 1-2x/wk x 2  visits for pain management, progress strengthening PRN. Weighted ball, rebounder, stabilization on ball    Treatment Last 4 Visits        5/13/2025 5/15/2025 5/20/2025 5/22/2025   OT Treatment   Treatment Day 3 4 5 6   Therapeutic Exercise Upper cycle 5 min, no resistance, forward/reverse    Scapular stabilization on wall (butterfly)    Ball alphabet on wall    Wall vs    Supine:  Dowel jass shoulder flexion/extension  Chest press  Each x 20    ROM to elbow end range flexion/extension  Wrist flexion/extension  Pronation/supination Wrist stabilization  Writing alphabet on weighted ball with dowel    Digiflex   Blue full  x 20  Green each digit x 15  Tripod pinch x 20    PREs (pain free range)  2 lb  Bicep curl  Triceps extension   Each x 20    Eccentric load and lengthen with flex bar  Wrist extension    Wrist flexion  Each x 20     Wall weight bands light  -high row: scapular retraction  -low pronated row  -triceps extension  -bicep curl  -shoulder extension  Each x 20    Scapular stabilization  Wall alphabet on ball     2 lb ball  Ball pass x 20  Overhead/behind back pass 10/10 Body blade  45 sec x 4    Yellow theraband  Hammer curl  Triceps extension  Wrist flexion  Wrist extension  Pronation  Supination  Shoulder flexion  Shoulder extension  ER  IR  Each x 20    Weighted bar  Bicep curl  Overhead press  Wrist rolls: flexion  Extension  Each x 20     Manual Therapy IASTM through wrist extensor wad, framing to lateral epicondyle and strumming to trigger points.  IASTM brachioradialis and into biceps muscle belly    Cupping to same Skin mobilization posterior elbow    Fascia mobilization over wrist extensors into upper arm    Passive ROM to wrist and elbow    Combined motions to end range    Self stretch wrist and elbow over ball IASTM  Sweep/scoop through wrist extensor wad  Framing to lateral epincondyle  Strumming to trigger points        Modalities  IFC, quad, sweep to pt tolerance, 10 min with ice IFC, quad,  target to lateral epicondyle, 10 min to pt tolerance with ice    Therapeutic Exercise Min 20 20 20 40   Manual Therapy Min 25 15 15    E-Stim Unattended Min  10 10    Total Timed Procedures 45 35 35 40   Total Service Procedures 45 45 45 40   Total Time 45 45 45 40         HEP  Yellow theraband  Hammer curl  Triceps extension  Wrist flexion  Wrist extension  Pronation  Supination  Each x 20    Charges     3 TE    Griselda Reeder OTR/L

## 2025-05-27 ENCOUNTER — OFFICE VISIT (OUTPATIENT)
Dept: OCCUPATIONAL MEDICINE | Age: 50
End: 2025-05-27
Attending: ORTHOPAEDIC SURGERY
Payer: COMMERCIAL

## 2025-05-27 PROCEDURE — 97110 THERAPEUTIC EXERCISES: CPT

## 2025-05-29 ENCOUNTER — OFFICE VISIT (OUTPATIENT)
Dept: OCCUPATIONAL MEDICINE | Age: 50
End: 2025-05-29
Attending: ORTHOPAEDIC SURGERY
Payer: COMMERCIAL

## 2025-05-29 PROCEDURE — 97110 THERAPEUTIC EXERCISES: CPT

## 2025-05-29 NOTE — PROGRESS NOTES
Patient: Riky Nixon (49 year old, male) Referring Provider:  Insurance:   Diagnosis: Lateral epicondylitis of right elbow (M77.11) Alen Vences  [employer] [facility]   Date of Surgery: NA Next MD visit:  BCBS OUT OF STATE   Precautions:  Drug Allergy TBD Referral Information:   Date of Injury: Late 2024 Date of Evaluation: Req: 0, Auth: 0, Exp:     25 POC Auth Visits:  8       Today's Date   2025     Discharge Summary  Pt has attended 8 visits in Occupational Therapy.      Subjective  \"I feel ok.       Pain: 0 (0/10 with most activities, however occasionally noted to be uncomfortable)/10     Objective       Hand Strength       2025   Hand Strength    Rt 83, 94, 92 av.6        (stress position)=75 with 1-2/10 pain 100        (stress position)=85 with no pain but discomfort 100        in stress position=77 with minimal to no pain    Lt 88, 89, 87 av        (stress position)=79 87 --         stress position 74 with no pain            Assessment  Pt experiencing much less pain overall and generally noted as only discomfort. Pt strength has improved and pt utilizing the UE normally during daily living tasks. At this time pt demo independence with HEP and will be d/c from formal therapy.    Goals (to be met in 8 visits)      Not Met Progress Toward Partially Met Met   Patient will report no more than 0/10 pain during self-care skill performance. [] [] [] [x]   Pt will demonstrate independence and compliance with HEP to maximize gains made in occupational therapy and progress toward functional independence.    [] [] [] [x]   Patient will demonstrate independent awareness of postural corrections to provide proximal stability for distal limb movements. [] [] [x] []   Patient to verbalize and demonstrate understanding of aggravating and alleviating factors related to lateral epicondylitis to decrease risk of recurrence for improved  functional use of right UE. [] [] [] [x]   Pt will increase  in stress position to equal to L with 0/10 pain for use while manipulating mouse and controls [] [] [] [x]    [] [] [] []           Plan  Discharge to [facility] only    Post QuickDASH Outcome Score  Post Score: (Patient-Rptd) 2.27 % (5/29/2025 10:28 AM)    2.28 % improvement          Patient/Family/Caregiver was advised of these findings, precautions, and treatment options and has agreed to actively participate in planning and for this course of care.    Thank you for your referral. If you have any questions, please contact me at Dept: [phone].    Sincerely,  Electronically signed by therapist: Griselda Reeder, OT     Physician's certification required:  No  Please co-sign or sign and return this letter via fax as soon as possible to [phone].   I certify the need for these services furnished under this plan of treatment and while under my care.    X___________________________________________________ Date____________________    Certification From: 5/29/2025  To:8/27/2025      Treatment Last 4 Visits        5/20/2025 5/22/2025 5/27/2025 5/29/2025   OT Treatment   Treatment Day 5 6 7 8   Therapeutic Exercise Wall weight bands light  -high row: scapular retraction  -low pronated row  -triceps extension  -bicep curl  -shoulder extension  Each x 20    Scapular stabilization  Wall alphabet on ball     2 lb ball  Ball pass x 20  Overhead/behind back pass 10/10 Body blade  45 sec x 4    Yellow theraband  Hammer curl  Triceps extension  Wrist flexion  Wrist extension  Pronation  Supination  Shoulder flexion  Shoulder extension  ER  IR  Each x 20    Weighted bar  Bicep curl  Overhead press  Wrist rolls: flexion  Extension  Each x 20   TRX  Pull up position x 20  Push up position x 20  Scapular stabilization x 15    Wall weights  Low row 20 lb  Bicep curl 15 lb  Triceps extension 15 lb  Each x 20    Rebounder with 2 lb ball    beige Flex bar  -wrist  flexion  -wrist extension  -bar bend  -reverse bar bend  -supination  -pronation  Each x 20    Gripper black level 3 x 20    Yellow Flex bar  -wrist flexion (vertical)  -wrist extension (vertical)  -bar bend  -reverse bar bend  -supination  -pronation  Each x 20     Digiflex blue full  x 30  Green each digit x 15  Tripod pinch x 20    Tennis elbow stretch x 10    Isometrics on pilate ring  Overhead press  Chest press  Wrist extension  Lat press  Each x 20   Manual Therapy IASTM  Sweep/scoop through wrist extensor wad  Framing to lateral epincondyle  Strumming to trigger points          Modalities IFC, quad, target to lateral epicondyle, 10 min to pt tolerance with ice      Therapeutic Exercise Min 20 40 40 40   Manual Therapy Min 15      E-Stim Unattended Min 10      Total Timed Procedures 35 40 40 40   Total Service Procedures 45 40 40 40   Total Time 45 40 40 40         HEP  Reviewed stretching, postural exercises    Charges     3 TE    Griselda Reeder, OTR/L

## 2025-06-12 RX ORDER — AMLODIPINE BESYLATE 10 MG/1
10 TABLET ORAL DAILY
Qty: 90 TABLET | Refills: 0 | Status: SHIPPED | OUTPATIENT
Start: 2025-06-12

## 2025-06-12 NOTE — TELEPHONE ENCOUNTER
Requesting    Name from pharmacy: amLODIPine Besylate Oral Tablet 10 MG         Will file in chart as: AMLODIPINE 10 MG Oral Tab    Sig: TAKE 1 TABLET BY MOUTH EVERY DAY    Disp: 90 tablet    Refills: 0    Start: 6/11/2025    Class: Normal    Non-formulary    Last ordered: 2 months ago (3/16/2025) by Meera Zepeda MD    Last refill: 3/16/2025    Rx #: [id number]    Hypertension Medications Protocol [place]5506/11/2025 03:43 PM   Protocol Details Last BP reading less than 140/90    CMP or BMP in past 12 months    In person appointment or virtual visit in the past 12 mos or appointment in next 3 mos    EGFRCR or GFRNAA > 50    Medication is active on med list           LOV: 3/11/2025  RTC: 6 months   Last Relevant Labs: 3/5/2025  Filled: 3/16/2025 #90 with 0 refills    No future appointments.

## 2025-07-30 ENCOUNTER — OFFICE VISIT (OUTPATIENT)
Dept: FAMILY MEDICINE CLINIC | Facility: CLINIC | Age: 50
End: 2025-07-30
Payer: COMMERCIAL

## 2025-07-30 VITALS
RESPIRATION RATE: 14 BRPM | HEIGHT: 72 IN | HEART RATE: 88 BPM | DIASTOLIC BLOOD PRESSURE: 80 MMHG | BODY MASS INDEX: 32.75 KG/M2 | WEIGHT: 241.81 LBS | SYSTOLIC BLOOD PRESSURE: 140 MMHG | OXYGEN SATURATION: 97 % | TEMPERATURE: 99 F

## 2025-07-30 DIAGNOSIS — J01.20 ACUTE NON-RECURRENT ETHMOIDAL SINUSITIS: Primary | ICD-10-CM

## 2025-07-30 PROCEDURE — 99213 OFFICE O/P EST LOW 20 MIN: CPT | Performed by: NURSE PRACTITIONER

## 2025-07-30 RX ORDER — DOXYCYCLINE HYCLATE 100 MG
100 TABLET ORAL 2 TIMES DAILY
Qty: 14 TABLET | Refills: 0 | Status: SHIPPED | OUTPATIENT
Start: 2025-07-30 | End: 2025-08-06

## 2025-08-13 ENCOUNTER — OFFICE VISIT (OUTPATIENT)
Dept: INTERNAL MEDICINE CLINIC | Facility: CLINIC | Age: 50
End: 2025-08-13

## 2025-08-13 VITALS
HEART RATE: 101 BPM | DIASTOLIC BLOOD PRESSURE: 85 MMHG | WEIGHT: 243 LBS | BODY MASS INDEX: 32.91 KG/M2 | OXYGEN SATURATION: 93 % | SYSTOLIC BLOOD PRESSURE: 145 MMHG | HEIGHT: 72 IN | TEMPERATURE: 98 F

## 2025-08-13 DIAGNOSIS — I10 PRIMARY HYPERTENSION: Primary | ICD-10-CM

## 2025-08-13 PROCEDURE — 99213 OFFICE O/P EST LOW 20 MIN: CPT | Performed by: FAMILY MEDICINE

## 2025-08-13 RX ORDER — EPINEPHRINE 0.3 MG/.3ML
0.3 INJECTION SUBCUTANEOUS AS NEEDED
Qty: 1 EACH | Refills: 1 | Status: SHIPPED | OUTPATIENT
Start: 2025-08-13 | End: 2026-08-13

## 2025-08-13 RX ORDER — NEBIVOLOL 5 MG/1
5 TABLET ORAL DAILY
Qty: 30 TABLET | Refills: 0 | Status: SHIPPED | OUTPATIENT
Start: 2025-08-13

## 2025-08-27 DIAGNOSIS — E03.9 HYPOTHYROIDISM, UNSPECIFIED TYPE: ICD-10-CM

## 2025-08-27 RX ORDER — LEVOTHYROXINE SODIUM 200 UG/1
200 TABLET ORAL
Qty: 90 TABLET | Refills: 0 | Status: SHIPPED | OUTPATIENT
Start: 2025-08-27

## (undated) DIAGNOSIS — Z13.0 SCREENING FOR DISORDER OF BLOOD AND BLOOD-FORMING ORGANS: ICD-10-CM

## (undated) DIAGNOSIS — Z00.00 ROUTINE GENERAL MEDICAL EXAMINATION AT A HEALTH CARE FACILITY: ICD-10-CM

## (undated) DIAGNOSIS — I10 ESSENTIAL HYPERTENSION: ICD-10-CM

## (undated) DIAGNOSIS — E03.9 HYPOTHYROIDISM, UNSPECIFIED TYPE: ICD-10-CM

## (undated) DIAGNOSIS — Z13.220 SCREENING FOR LIPID DISORDERS: ICD-10-CM

## (undated) DIAGNOSIS — Z13.228 SCREENING FOR METABOLIC DISORDER: ICD-10-CM

## (undated) DIAGNOSIS — Z86.16 HISTORY OF COVID-19: Primary | ICD-10-CM

## (undated) DIAGNOSIS — E03.9 HYPOTHYROIDISM, UNSPECIFIED TYPE: Primary | ICD-10-CM

## (undated) DEVICE — 1200CC GUARDIAN II: Brand: GUARDIAN

## (undated) DEVICE — KIT CUSTOM ENDOPROCEDURE STERIS

## (undated) DEVICE — Device: Brand: DEFENDO AIR/WATER/SUCTION AND BIOPSY VALVE

## (undated) DEVICE — BLOCK BITE MAXI 60FR

## (undated) DEVICE — ENDOSCOPY PACK UPPER: Brand: MEDLINE INDUSTRIES, INC.

## (undated) DEVICE — STERIS KITS

## (undated) DEVICE — 10FT COMBINED O2 DELIVERY/CO2 MONITORING. FILTER WITH MICROSTREAM TYPE LUER: Brand: DUAL ADULT NASAL CANNULA

## (undated) DEVICE — KIT VLV 5 PC AIR H2O SUCT BX ENDOGATOR CONN

## (undated) DEVICE — BIOGUARD CLEANING ADAPTER

## (undated) DEVICE — 3M™ RED DOT™ MONITORING ELECTRODE WITH FOAM TAPE AND STICKY GEL, 50/BAG, 20/CASE, 72/PLT 2570: Brand: RED DOT™

## (undated) DEVICE — FORCEP BIOPSY RJ4 LG CAP W/ND

## (undated) DEVICE — FILTERLINE NASAL ADULT O2/CO2

## (undated) DEVICE — GIJAW SINGLE-USE BIOPSY FORCEPS WITH NEEDLE: Brand: GIJAW

## (undated) DEVICE — VALVE KIT SGL USE DEFENDO

## (undated) DEVICE — BITEBLOCK ENDOSCP 60FR MAXI STRP

## (undated) NOTE — MR AVS SNAPSHOT
[place]  [address]  [address] [facility] [address] [phone]               Thank you for choosing us for your health care visit with Jana Baker MD.  We are glad to serve you and happy to provide you with this sum Summaries. If you've been to the Emergency Department or your doctor's office, you can view your past visit information in [employer] by going to Visits < Visit Summaries. [employer] questions? Call [phone] for help.   [employer] is NOT to be used for urge

## (undated) NOTE — LETTER
Date: 3/23/2020    Patient Name: Gee Langford        To Whom it may concern,     This letter has been written at the patient's request. In light of the COVID-19 pandemic and in conjunction with the CDC's recommendations to reduce the exposure and risk of

## (undated) NOTE — LETTER
May 12, 2021    To whom it may concern,    Troy Goldberg,  75, is a patient of ours with idiopathic hypothyroidism and hypertension. He is on Synthroid 200 mcg daily for the thyroid and Losartan HCT 50-12.5 daily for the blood pressure.    H

## (undated) NOTE — LETTER
Date: 10/28/2019    Patient Name: Gordon Monroy          To Whom it may concern: The above patient was seen at the [facility] for treatment of a medical condition- Sinusitis.     This patient should be excused from attending work  from [address] [address] [place] [address] 53

## (undated) NOTE — LETTER
Date: 8/28/2024    Patient Name: Riky Nixon          To Whom it may concern:      The above patient was seen at [facility] for the treatment of hypertension.   He has been on the norvasc 5mg daily for the last week and has tolerated it very well.   He has no side effects whatsoever.   His blood pressure today was 120/70 which represents excellent control.    He has no flying restrictions.          Sincerely,      Meera Zepeda MD

## (undated) NOTE — LETTER
May 4, 2023    To whom it may concern,    Clint Martin,  75, is a patient of ours with idiopathic hypothyroidism and hypertension. He is on Synthroid 200 mcg daily for the thyroid and Losartan HCT 50-12.5 daily for the blood pressure. His thyroid levels were checked a few months ago and are in normal range. His blood pressure was checked today and is controlled. He is overall very stable and has no complications. 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      Sincerely,     Gina Lemus M.D.

## (undated) NOTE — LETTER
Date: 2/26/2019    Patient Name: Forrest Chung          To Whom it may concern: The above patient was seen at the [facility] for treatment of a medical condition.     This patient should be excused from attending work from 2/26/2019 tanisha

## (undated) NOTE — LETTER
2025    Regarding Patient: Riky Nixon  : 1975    To Whom It May Concern:      Riky Nixon,  75, is a patient of ours with idiopathic hypothyroidism and hypertension.  He is on Synthroid 200 mcg daily for the thyroid and Amlodipine 10mg daily for the blood pressure.   His thyroid levels were checked in March and are in normal range.  His blood pressure was checked in March and was controlled.     He is overall very stable and has no complications.  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        Sincerely,            Meera Zepeda MD

## (undated) NOTE — LETTER
Date: 6/6/2022      Patient Name: Joe Pal          To Whom it may concern: The above is a patient of ours who has hypertension as well as hypothyroidism. His blood pressure is well controlled with medications. The thyroid levels were checked in February and were within normal limits.    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

## (undated) NOTE — LETTER
02/16/22        [address]  [address] [facility] [address]      Dear Mayo Malloy,    [facility] records indicate that you have outstanding lab work and or testing that was ordered for you and has not yet been completed:  Orders Placed This Encounter      XR CHEST PA + LAT CHEST (HIT=[id number])    To provide you with the best possible care, please complete these orders at your earliest convenience. If you have recently completed these orders please disregard this letter. If you have any questions please call the office at Dept: [phone].      Thank you,       EVETTE Andrade

## (undated) NOTE — MR AVS SNAPSHOT
[place]  [address] [address]  [address] [facility] [id number] [phone]               Thank you for choosing us for your health care visit with Octavio Nolasco NP.   We are glad to serve you and happy to provide you with this sum These medications were sent to [address] [place] [address]  [address], [address] [facility]  [address] [phone], [phone] [address], [place] [id number]     Phone:  [phone]    - azithromycin 250 MG Tabs            MyChart     Visit MyChart  You can Get your heart pumping – brisk walking, biking, swimming Even 10 minute increments are effective and add up over the week   2 ½ hours per week – spread out over time Use a sonja to keep you motivated   Don’t forget strength training with weights and resist